# Patient Record
Sex: MALE | Race: WHITE | Employment: FULL TIME | URBAN - METROPOLITAN AREA
[De-identification: names, ages, dates, MRNs, and addresses within clinical notes are randomized per-mention and may not be internally consistent; named-entity substitution may affect disease eponyms.]

---

## 2017-01-18 ENCOUNTER — ALLSCRIPTS OFFICE VISIT (OUTPATIENT)
Dept: OTHER | Facility: OTHER | Age: 62
End: 2017-01-18

## 2017-07-19 ENCOUNTER — ALLSCRIPTS OFFICE VISIT (OUTPATIENT)
Dept: OTHER | Facility: OTHER | Age: 62
End: 2017-07-19

## 2017-07-22 ENCOUNTER — LAB CONVERSION - ENCOUNTER (OUTPATIENT)
Dept: OTHER | Facility: OTHER | Age: 62
End: 2017-07-22

## 2017-07-22 LAB
25(OH)D3 SERPL-MCNC: 28 NG/ML (ref 30–100)
A/G RATIO (HISTORICAL): 1.3 (CALC) (ref 1–2.5)
ALBUMIN SERPL BCP-MCNC: 4.1 G/DL (ref 3.6–5.1)
ALP SERPL-CCNC: 89 U/L (ref 40–115)
ALT SERPL W P-5'-P-CCNC: 28 U/L (ref 9–46)
AST SERPL W P-5'-P-CCNC: 21 U/L (ref 10–35)
BASOPHILS # BLD AUTO: 0.5 %
BASOPHILS # BLD AUTO: 53 CELLS/UL (ref 0–200)
BILIRUB SERPL-MCNC: 0.8 MG/DL (ref 0.2–1.2)
BUN SERPL-MCNC: 17 MG/DL (ref 7–25)
BUN/CREA RATIO (HISTORICAL): NORMAL (CALC) (ref 6–22)
CALCIUM SERPL-MCNC: 9.6 MG/DL (ref 8.6–10.3)
CHLORIDE SERPL-SCNC: 101 MMOL/L (ref 98–110)
CHOLEST SERPL-MCNC: 205 MG/DL (ref 125–200)
CHOLEST/HDLC SERPL: 5.3 (CALC)
CO2 SERPL-SCNC: 29 MMOL/L (ref 20–31)
CREAT SERPL-MCNC: 0.79 MG/DL (ref 0.7–1.33)
DEPRECATED RDW RBC AUTO: 12.7 % (ref 11–15)
EGFR AFRICAN AMERICAN (HISTORICAL): 114 ML/MIN/1.73M2
EGFR-AMERICAN CALC (HISTORICAL): 98 ML/MIN/1.73M2
EOSINOPHIL # BLD AUTO: 1 %
EOSINOPHIL # BLD AUTO: 106 CELLS/UL (ref 15–500)
GAMMA GLOBULIN (HISTORICAL): 3.1 G/DL (CALC) (ref 1.9–3.7)
GLUCOSE (HISTORICAL): 98 MG/DL (ref 65–99)
HCT VFR BLD AUTO: 43.6 % (ref 38.5–50)
HDLC SERPL-MCNC: 39 MG/DL
HGB BLD-MCNC: 14.5 G/DL (ref 13.2–17.1)
LDL CHOLESTEROL (HISTORICAL): 126 MG/DL (CALC)
LYMPHOCYTES # BLD AUTO: 20.6 %
LYMPHOCYTES # BLD AUTO: 2184 CELLS/UL (ref 850–3900)
MCH RBC QN AUTO: 29.8 PG (ref 27–33)
MCHC RBC AUTO-ENTMCNC: 33.3 G/DL (ref 32–36)
MCV RBC AUTO: 89.5 FL (ref 80–100)
MONOCYTES # BLD AUTO: 795 CELLS/UL (ref 200–950)
MONOCYTES (HISTORICAL): 7.5 %
NEUTROPHILS # BLD AUTO: 70.4 %
NEUTROPHILS # BLD AUTO: 7462 CELLS/UL (ref 1500–7800)
NON-HDL-CHOL (CHOL-HDL) (HISTORICAL): 166 MG/DL (CALC)
PLATELET # BLD AUTO: 236 THOUSAND/UL (ref 140–400)
PMV BLD AUTO: 11.6 FL (ref 7.5–12.5)
POTASSIUM SERPL-SCNC: 3.9 MMOL/L (ref 3.5–5.3)
RBC # BLD AUTO: 4.87 MILLION/UL (ref 4.2–5.8)
SODIUM SERPL-SCNC: 139 MMOL/L (ref 135–146)
TOTAL PROTEIN (HISTORICAL): 7.2 G/DL (ref 6.1–8.1)
TRIGL SERPL-MCNC: 201 MG/DL
TSH SERPL DL<=0.05 MIU/L-ACNC: 4.21 MIU/L (ref 0.4–4.5)
WBC # BLD AUTO: 10.6 THOUSAND/UL (ref 3.8–10.8)

## 2017-07-26 ENCOUNTER — GENERIC CONVERSION - ENCOUNTER (OUTPATIENT)
Dept: OTHER | Facility: OTHER | Age: 62
End: 2017-07-26

## 2017-08-02 ENCOUNTER — ALLSCRIPTS OFFICE VISIT (OUTPATIENT)
Dept: OTHER | Facility: OTHER | Age: 62
End: 2017-08-02

## 2018-01-11 NOTE — RESULT NOTES
Verified Results  (1) CBC/PLT/DIFF [id number] 09:27AM Cary Martin     Test Name Result Flag Reference   WHITE BLOOD CELL COUNT 10 6 Thousand/uL  3 8-10 8   RED BLOOD CELL COUNT 4 87 Million/uL  4 20-5 80   HEMOGLOBIN 14 5 g/dL  13 2-17 1   HEMATOCRIT 43 6 %  38 5-50 0   MCV 89 5 fL  80 0-100 0   MCH 29 8 pg  27 0-33 0   MCHC 33 3 g/dL  32 0-36 0   RDW 12 7 %  11 0-15 0   PLATELET COUNT 019 Thousand/uL  140-400   ABSOLUTE NEUTROPHILS 7462 cells/uL  9536-4053   ABSOLUTE LYMPHOCYTES 2184 cells/uL  850-3900   ABSOLUTE MONOCYTES 795 cells/uL  200-950   ABSOLUTE EOSINOPHILS 106 cells/uL     ABSOLUTE BASOPHILS 53 cells/uL  0-200   NEUTROPHILS 70 4 %     LYMPHOCYTES 20 6 %     MONOCYTES 7 5 %     EOSINOPHILS 1 0 %     BASOPHILS 0 5 %     MPV 11 6 fL  7 5-12 5     (1) COMPREHENSIVE METABOLIC PANEL [id number] 22:88FS Cary Martin     Test Name Result Flag Reference   GLUCOSE 98 mg/dL  65-99   Fasting reference interval   UREA NITROGEN (BUN) 17 mg/dL  7-25   CREATININE 0 79 mg/dL  0 70-1 33   For patients >52years of age, the reference limit  for Creatinine is approximately 13% higher for people  identified as -American  eGFR NON-AFR   AMERICAN 98 mL/min/1 73m2  > OR = 60   eGFR AFRICAN AMERICAN 114 mL/min/1 73m2  > OR = 60   BUN/CREATININE RATIO   1-94   NOT APPLICABLE (calc)   SODIUM 139 mmol/L  135-146   POTASSIUM 3 9 mmol/L  3 5-5 3   CHLORIDE 101 mmol/L     CARBON DIOXIDE 29 mmol/L  20-31   CALCIUM 9 6 mg/dL  8 6-10 3   PROTEIN, TOTAL 7 2 g/dL  6 1-8 1   ALBUMIN 4 1 g/dL  3 6-5 1   GLOBULIN 3 1 g/dL (calc)  1 9-3 7   ALBUMIN/GLOBULIN RATIO 1 3 (calc)  1 0-2 5   BILIRUBIN, TOTAL 0 8 mg/dL  0 2-1 2   ALKALINE PHOSPHATASE 89 U/L     AST 21 U/L  10-35   ALT 28 U/L  9-46     (Q) LIPID PANEL WITH REFLEX TO DIRECT LDL [id number] 09:27AM Cary Martin     Test Name Result Flag Reference   CHOLESTEROL, TOTAL 205 mg/dL H 125-200   HDL CHOLESTEROL 39 mg/dL L > OR = 40   TRIGLICERIDES 813 mg/dL H <150   LDL-CHOLESTEROL 126 mg/dL (calc)  <130   Desirable range <100 mg/dL for patients with CHD or  diabetes and <70 mg/dL for diabetic patients with  known heart disease  CHOL/HDLC RATIO 5 3 (calc) H < OR = 5 0   NON HDL CHOLESTEROL 166 mg/dL (calc) H    Target for non-HDL cholesterol is 30 mg/dL higher than   LDL cholesterol target  *(Q) VITAMIN D, 25-HYDROXY, LC/MS/MS [id number] 09:27AM Cary Martin     Test Name Result Flag Reference   VITAMIN D, 25-OH, TOTAL 28 ng/mL L    Vitamin D Status         25-OH Vitamin D:     Deficiency:                    <20 ng/mL  Insufficiency:             20 - 29 ng/mL  Optimal:                 > or = 30 ng/mL     For 25-OH Vitamin D testing on patients on   D2-supplementation and patients for whom quantitation   of D2 and D3 fractions is required, the QuestAssureD(TM)  25-OH VIT D, (D2,D3), LC/MS/MS is recommended: order   code 47335 (patients >2yrs)  For more information on this test, go to:  http://[employer]/faq/ZBA841  (This link is being provided for   informational/educational purposes only )     (Q) TSH, 3RD GENERATION W/REFLEX TO FT4 [id number] 09:27AM Cary Martin   REPORT COMMENT:  FASTING:YES     Test Name Result Flag Reference   TSH W/REFLEX TO FT4 4 21 mIU/L  0 40-4 50

## 2018-01-13 VITALS
BODY MASS INDEX: 33.68 KG/M2 | HEIGHT: 70 IN | HEART RATE: 68 BPM | TEMPERATURE: 97.1 F | RESPIRATION RATE: 16 BRPM | WEIGHT: 235.25 LBS | SYSTOLIC BLOOD PRESSURE: 138 MMHG | DIASTOLIC BLOOD PRESSURE: 78 MMHG

## 2018-01-14 VITALS
HEART RATE: 70 BPM | SYSTOLIC BLOOD PRESSURE: 140 MMHG | DIASTOLIC BLOOD PRESSURE: 80 MMHG | TEMPERATURE: 98 F | HEIGHT: 70 IN | RESPIRATION RATE: 16 BRPM | WEIGHT: 233.38 LBS | BODY MASS INDEX: 33.41 KG/M2

## 2018-01-15 VITALS
WEIGHT: 238.5 LBS | BODY MASS INDEX: 34.14 KG/M2 | HEIGHT: 70 IN | SYSTOLIC BLOOD PRESSURE: 148 MMHG | TEMPERATURE: 96.7 F | HEART RATE: 72 BPM | DIASTOLIC BLOOD PRESSURE: 82 MMHG

## 2018-02-14 ENCOUNTER — OFFICE VISIT (OUTPATIENT)
Dept: FAMILY MEDICINE CLINIC | Facility: CLINIC | Age: 63
End: 2018-02-14
Payer: COMMERCIAL

## 2018-02-14 VITALS
TEMPERATURE: 97 F | BODY MASS INDEX: 35.84 KG/M2 | DIASTOLIC BLOOD PRESSURE: 84 MMHG | HEART RATE: 76 BPM | SYSTOLIC BLOOD PRESSURE: 138 MMHG | WEIGHT: 242 LBS | RESPIRATION RATE: 16 BRPM | HEIGHT: 69 IN

## 2018-02-14 DIAGNOSIS — I10 HYPERTENSION, UNSPECIFIED TYPE: ICD-10-CM

## 2018-02-14 DIAGNOSIS — Z12.11 COLON CANCER SCREENING: ICD-10-CM

## 2018-02-14 DIAGNOSIS — Z12.5 SCREENING FOR PROSTATE CANCER: ICD-10-CM

## 2018-02-14 DIAGNOSIS — Z00.00 ROUTINE HEALTH MAINTENANCE: Primary | ICD-10-CM

## 2018-02-14 DIAGNOSIS — R53.83 FATIGUE, UNSPECIFIED TYPE: ICD-10-CM

## 2018-02-14 DIAGNOSIS — R73.01 IMPAIRED FASTING GLUCOSE: ICD-10-CM

## 2018-02-14 DIAGNOSIS — E78.5 HYPERLIPIDEMIA, UNSPECIFIED HYPERLIPIDEMIA TYPE: ICD-10-CM

## 2018-02-14 PROCEDURE — 99396 PREV VISIT EST AGE 40-64: CPT | Performed by: FAMILY MEDICINE

## 2018-02-14 RX ORDER — ATORVASTATIN CALCIUM 10 MG/1
1 TABLET, FILM COATED ORAL
COMMUNITY
Start: 2017-08-02 | End: 2018-02-14 | Stop reason: SDUPTHER

## 2018-02-14 RX ORDER — METOPROLOL TARTRATE 50 MG/1
1 TABLET, FILM COATED ORAL DAILY
COMMUNITY
Start: 2012-11-07 | End: 2018-02-14 | Stop reason: SDUPTHER

## 2018-02-14 RX ORDER — ATORVASTATIN CALCIUM 10 MG/1
10 TABLET, FILM COATED ORAL
Qty: 90 TABLET | Refills: 1 | Status: SHIPPED | OUTPATIENT
Start: 2018-02-14 | End: 2018-08-24 | Stop reason: SDUPTHER

## 2018-02-14 RX ORDER — CHOLECALCIFEROL (VITAMIN D3) 50 MCG
5000 TABLET ORAL DAILY
COMMUNITY

## 2018-02-14 RX ORDER — HYDROCHLOROTHIAZIDE 25 MG/1
1 TABLET ORAL DAILY
COMMUNITY
Start: 2015-07-01 | End: 2018-02-14 | Stop reason: SDUPTHER

## 2018-02-14 RX ORDER — BENAZEPRIL HYDROCHLORIDE 20 MG/1
40 TABLET ORAL DAILY
Qty: 90 TABLET | Refills: 1 | Status: SHIPPED | OUTPATIENT
Start: 2018-02-14 | End: 2018-08-24 | Stop reason: SDUPTHER

## 2018-02-14 RX ORDER — METOPROLOL TARTRATE 50 MG/1
50 TABLET, FILM COATED ORAL DAILY
Qty: 90 TABLET | Refills: 1 | Status: SHIPPED | OUTPATIENT
Start: 2018-02-14 | End: 2018-08-24 | Stop reason: SDUPTHER

## 2018-02-14 RX ORDER — AMLODIPINE BESYLATE 10 MG/1
10 TABLET ORAL DAILY
Qty: 90 TABLET | Refills: 1 | Status: SHIPPED | OUTPATIENT
Start: 2018-02-14 | End: 2018-08-24 | Stop reason: SDUPTHER

## 2018-02-14 RX ORDER — IPRATROPIUM BROMIDE 42 UG/1
2 SPRAY, METERED NASAL 3 TIMES DAILY PRN
COMMUNITY
Start: 2016-07-05

## 2018-02-14 RX ORDER — BENAZEPRIL HYDROCHLORIDE 20 MG/1
2 TABLET ORAL DAILY
COMMUNITY
Start: 2012-11-07 | End: 2018-02-14 | Stop reason: SDUPTHER

## 2018-02-14 RX ORDER — AMLODIPINE BESYLATE 10 MG/1
1 TABLET ORAL DAILY
COMMUNITY
Start: 2012-11-07 | End: 2018-02-14 | Stop reason: SDUPTHER

## 2018-02-14 RX ORDER — HYDROCHLOROTHIAZIDE 25 MG/1
25 TABLET ORAL DAILY
Qty: 90 TABLET | Refills: 1 | Status: SHIPPED | OUTPATIENT
Start: 2018-02-14 | End: 2018-08-24 | Stop reason: SDUPTHER

## 2018-02-14 NOTE — ASSESSMENT & PLAN NOTE
BP is overall stable today  Continue with amlodipine, benazepril, hydrochlorothiazide, metoprolol as well as lifestyle modifications  Will check CMP

## 2018-02-14 NOTE — ASSESSMENT & PLAN NOTE
Referral to GI provided for screening colonoscopy  Will check routine blood work  Have discussed health maintenance in general including maintaining well balanced diet, getting adequate amount of sleep, starting routine exercise regimen and working on weight loss    Follow-up in 6 months or sooner if needed

## 2018-02-14 NOTE — PROGRESS NOTES
FAMILY PRACTICE OFFICE VISIT       NAME: Selina Galloway  AGE: 63 y o  SEX: male       : 1955        MRN: [id number]    DATE: 2018  TIME: 12:32 PM    Assessment and Plan     Problem List Items Addressed This Visit     Routine health maintenance - Primary       Referral to GI provided for screening colonoscopy  Will check routine blood work  Have discussed health maintenance in general including maintaining well balanced diet, getting adequate amount of sleep, starting routine exercise regimen and working on weight loss  Follow-up in 6 months or sooner if needed         Hypertension     BP is overall stable today  Continue with amlodipine, benazepril, hydrochlorothiazide, metoprolol as well as lifestyle modifications  Will check CMP  Relevant Medications    amLODIPine (NORVASC) 10 mg tablet    benazepril (LOTENSIN) 20 mg tablet    hydrochlorothiazide (HYDRODIURIL) 25 mg tablet    metoprolol tartrate (LOPRESSOR) 50 mg tablet    Other Relevant Orders    CBC and differential    Hyperlipidemia       Patient is on atorvastatin  Continue with lifestyle modifications  Will recheck lipid panel  Relevant Medications    atorvastatin (LIPITOR) 10 mg tablet    Other Relevant Orders    Comprehensive metabolic panel    Lipid Panel with Direct LDL reflex    TSH, 3rd generation with T4 reflex    Impaired fasting glucose       Will check A1c  Have recommended limiting carbohydrates in the diet as well as continue with lifestyle modifications including exercise and working on weight loss  Relevant Orders    Comprehensive metabolic panel    Hemoglobin A1c    Fatigue    Relevant Orders    Vitamin D 25 hydroxy    Screening for prostate cancer    Relevant Orders    PSA, total and free            There are no Patient Instructions on file for this visit          Chief Complaint     Chief Complaint   Patient presents with    Physical Exam     Pt is here for annual physical        History of Present Illness     HPI   63-year-old male here for routine health maintenance exam   Patient states he is doing well overall  Patient is a  and has a difficult time obtaining healthy foods all the time  Patient also has not been exercising as well because of his schedule  He will make an effort to start exercising  Patient states his bp at app2you physical in Nov was 132/74  Has been using green tea  Review of Systems   Review of Systems   Constitutional: Negative  Negative for unexpected weight change  HENT: Negative  Eyes: Negative  Negative for visual disturbance  Respiratory: Negative  Negative for shortness of breath  Cardiovascular: Negative  Gastrointestinal: Negative  Negative for abdominal pain and constipation  Endocrine: Negative  Genitourinary: Negative  Negative for dysuria  Psychiatric/Behavioral: Negative  Negative for dysphoric mood  Active Problem List     Patient Active Problem List   Diagnosis    Routine health maintenance    Hypertension    Hyperlipidemia    Impaired fasting glucose    Fatigue    Screening for prostate cancer       Past Medical History:  No past medical history on file  Past Surgical History:  No past surgical history on file  Family History:  No family history on file  Social History:  Social History     Social History    Marital status: Single     Spouse name: N/A    Number of children: N/A    Years of education: N/A     Occupational History    Not on file  Social History Main Topics    Smoking status: Never Smoker    Smokeless tobacco: Never Used    Alcohol use No    Drug use: No    Sexual activity: Not on file     Other Topics Concern    Not on file     Social History Narrative    No narrative on file   I have reviewed the patient's medical history in detail; there are no changes to the history as noted in the electronic medical record        Objective     Vitals:    02/14/18 1134   BP: 138/84   Pulse: Resp:    Temp:      Wt Readings from Last 3 Encounters:   02/14/18 110 kg (242 lb)   08/02/17 107 kg (235 lb 4 oz)   07/19/17 108 kg (238 lb 8 oz)     Vitals:    02/14/18 1054 02/14/18 1134   BP: 142/80 138/84   BP Location: Right arm    Patient Position: Sitting    Cuff Size: Adult    Pulse: 76    Resp: 16    Temp: (!) 97 °F (36 1 °C)    TempSrc: Tympanic    Weight: 110 kg (242 lb)    Height: 5' 9" (1 753 m)        Physical Exam   Constitutional: He is oriented to person, place, and time  He appears well-developed and well-nourished  HENT:   Head: Normocephalic and atraumatic  Mouth/Throat: Oropharynx is clear and moist    Tms intact   Eyes: Conjunctivae and EOM are normal  Pupils are equal, round, and reactive to light  Neck: Normal range of motion  Neck supple  No thyromegaly present  Cardiovascular: Normal rate and regular rhythm  No murmur heard  Pulmonary/Chest: Effort normal and breath sounds normal    Abdominal: Soft  Bowel sounds are normal  There is no tenderness  Musculoskeletal: Normal range of motion  Lymphadenopathy:     He has no cervical adenopathy  Neurological: He is alert and oriented to person, place, and time  Skin: No rash noted  Psychiatric: He has a normal mood and affect  Nursing note and vitals reviewed        Pertinent Laboratory/Diagnostic Studies:  Lab Results   Component Value Date    BUN 15 06/21/2016    CREATININE 0 82 06/21/2016    CALCIUM 9 5 06/21/2016     06/21/2016    K 4 2 06/21/2016    CO2 22 06/21/2016     06/21/2016     No results found for: ALT, AST, GGT, ALKPHOS, BILITOT    No results found for: WBC, HGB, HCT, MCV, PLT    No results found for: TSH    Lab Results   Component Value Date    CHOL 205 (H) 07/21/2017     Lab Results   Component Value Date    TRIG 201 (H) 07/21/2017     Lab Results   Component Value Date    HDL 39 (L) 07/21/2017     No results found for: [facility]  Lab Results   Component Value Date    HGBA1C 5 7 (H) 06/21/2016 Results for orders placed or performed in visit on 07/22/17   TSH, 3RD GENERATION WITH T4 REFLEX (HISTORICAL)   Result Value Ref Range    TSH 3RD GENERATON 4 21 0 40 - 4 50 mIU/L   VITAMIN D 25 HYDROXY (HISTORICAL)   Result Value Ref Range    Vit D, 25-Hydroxy 28 (L) 30 - 100 ng/mL   LIPID PANEL WITH DIRECT LDL REFLEX (HISTORICAL)   Result Value Ref Range    Cholesterol 205 (H) 125 - 200 mg/dL    HDL 39 (L) > OR = 40 mg/dL    Triglycerides 201 (H) <150 mg/dL    LDL CHOLESTEROL 126 <130 mg/dL (calc)    Chol/HDL Ratio 5 3 (H) < OR = 5 0 (calc)    NON-HDL-CHOL (CHOL-HDL) 166 (H) mg/dL (calc)       Orders Placed This Encounter   Procedures    CBC and differential    Comprehensive metabolic panel    Lipid Panel with Direct LDL reflex    TSH, 3rd generation with T4 reflex    Vitamin D 25 hydroxy    Hemoglobin A1c    PSA, total and free    Ambulatory referral to Gastroenterology       ALLERGIES:  No Known Allergies    Current Medications     Current Outpatient Prescriptions   Medication Sig Dispense Refill    amLODIPine (NORVASC) 10 mg tablet Take 1 tablet (10 mg total) by mouth daily 90 tablet 1    aspirin 81 MG tablet Take by mouth daily        atorvastatin (LIPITOR) 10 mg tablet Take 1 tablet (10 mg total) by mouth daily at bedtime 90 tablet 1    benazepril (LOTENSIN) 20 mg tablet Take 2 tablets (40 mg total) by mouth daily 90 tablet 1    Cholecalciferol (VITAMIN D) 2000 units tablet Take 2,000 Units by mouth daily        hydrochlorothiazide (HYDRODIURIL) 25 mg tablet Take 1 tablet (25 mg total) by mouth daily 90 tablet 1    ipratropium (ATROVENT) 0 06 % nasal spray 2 sprays into each nostril 3 (three) times a day as needed        metoprolol tartrate (LOPRESSOR) 50 mg tablet Take 1 tablet (50 mg total) by mouth daily 90 tablet 1    Multiple Vitamin (MULTI-VITAMIN) tablet Take by mouth       No current facility-administered medications for this visit            Health Maintenance   There are no preventive care reminders to display for this patient    Immunization History   Administered Date(s) Administered     Influenza (IM) Preservative Free 01/06/2016    Influenza Quadrivalent Preservative Free 3 years and older IM 01/18/2017    Influenza TIV (IM) 10/21/2014    Tdap 07/01/2015       Carlito Donald MD

## 2018-02-14 NOTE — ASSESSMENT & PLAN NOTE
Will check A1c  Have recommended limiting carbohydrates in the diet as well as continue with lifestyle modifications including exercise and working on weight loss

## 2018-08-09 LAB
25(OH)D3 SERPL-MCNC: 31 NG/ML (ref 30–100)
ALBUMIN SERPL-MCNC: 3.9 G/DL (ref 3.6–5.1)
ALBUMIN/GLOB SERPL: 1.3 (CALC) (ref 1–2.5)
ALP SERPL-CCNC: 89 U/L (ref 40–115)
ALT SERPL-CCNC: 32 U/L (ref 9–46)
AST SERPL-CCNC: 21 U/L (ref 10–35)
BASOPHILS # BLD AUTO: 29 CELLS/UL (ref 0–200)
BASOPHILS NFR BLD AUTO: 0.3 %
BILIRUB SERPL-MCNC: 0.7 MG/DL (ref 0.2–1.2)
BUN SERPL-MCNC: 16 MG/DL (ref 7–25)
BUN/CREAT SERPL: ABNORMAL (CALC) (ref 6–22)
CALCIUM SERPL-MCNC: 9.2 MG/DL (ref 8.6–10.3)
CHLORIDE SERPL-SCNC: 103 MMOL/L (ref 98–110)
CHOLEST SERPL-MCNC: 162 MG/DL
CHOLEST/HDLC SERPL: 4.8 (CALC)
CO2 SERPL-SCNC: 25 MMOL/L (ref 20–32)
CREAT SERPL-MCNC: 0.84 MG/DL (ref 0.7–1.25)
EOSINOPHIL # BLD AUTO: 147 CELLS/UL (ref 15–500)
EOSINOPHIL NFR BLD AUTO: 1.5 %
ERYTHROCYTE [DISTWIDTH] IN BLOOD BY AUTOMATED COUNT: 12.3 % (ref 11–15)
GLOBULIN SER CALC-MCNC: 3 G/DL (CALC) (ref 1.9–3.7)
GLUCOSE SERPL-MCNC: 127 MG/DL (ref 65–99)
HCT VFR BLD AUTO: 43.5 % (ref 38.5–50)
HDLC SERPL-MCNC: 34 MG/DL
HGB BLD-MCNC: 14.6 G/DL (ref 13.2–17.1)
LDLC SERPL CALC-MCNC: 100 MG/DL (CALC)
LYMPHOCYTES # BLD AUTO: 1921 CELLS/UL (ref 850–3900)
LYMPHOCYTES NFR BLD AUTO: 19.6 %
MCH RBC QN AUTO: 30.2 PG (ref 27–33)
MCHC RBC AUTO-ENTMCNC: 33.6 G/DL (ref 32–36)
MCV RBC AUTO: 89.9 FL (ref 80–100)
MONOCYTES # BLD AUTO: 647 CELLS/UL (ref 200–950)
MONOCYTES NFR BLD AUTO: 6.6 %
NEUTROPHILS # BLD AUTO: 7056 CELLS/UL (ref 1500–7800)
NEUTROPHILS NFR BLD AUTO: 72 %
NONHDLC SERPL-MCNC: 128 MG/DL (CALC)
PLATELET # BLD AUTO: 228 THOUSAND/UL (ref 140–400)
PMV BLD REES-ECKER: 11.8 FL (ref 7.5–12.5)
POTASSIUM SERPL-SCNC: 3.8 MMOL/L (ref 3.5–5.3)
PROT SERPL-MCNC: 6.9 G/DL (ref 6.1–8.1)
PSA FREE MFR SERPL: 26 % (CALC)
PSA FREE SERPL-MCNC: 0.5 NG/ML
PSA SERPL-MCNC: 1.9 NG/ML
RBC # BLD AUTO: 4.84 MILLION/UL (ref 4.2–5.8)
SL AMB EGFR AFRICAN AMERICAN: 110 ML/MIN/1.73M2
SL AMB EGFR NON AFRICAN AMERICAN: 95 ML/MIN/1.73M2
SODIUM SERPL-SCNC: 138 MMOL/L (ref 135–146)
TRIGL SERPL-MCNC: 184 MG/DL
TSH SERPL-ACNC: 3.69 MIU/L (ref 0.4–4.5)
WBC # BLD AUTO: 9.8 THOUSAND/UL (ref 3.8–10.8)

## 2018-08-15 ENCOUNTER — OFFICE VISIT (OUTPATIENT)
Dept: FAMILY MEDICINE CLINIC | Facility: CLINIC | Age: 63
End: 2018-08-15
Payer: COMMERCIAL

## 2018-08-15 DIAGNOSIS — I10 HYPERTENSION, UNSPECIFIED TYPE: Primary | ICD-10-CM

## 2018-08-15 DIAGNOSIS — Z00.00 ROUTINE HEALTH MAINTENANCE: ICD-10-CM

## 2018-08-15 DIAGNOSIS — E78.6 LOW HDL (UNDER 40): ICD-10-CM

## 2018-08-15 DIAGNOSIS — R73.01 IMPAIRED FASTING GLUCOSE: ICD-10-CM

## 2018-08-15 DIAGNOSIS — E78.5 HYPERLIPIDEMIA, UNSPECIFIED HYPERLIPIDEMIA TYPE: ICD-10-CM

## 2018-08-15 DIAGNOSIS — Z12.11 COLON CANCER SCREENING: ICD-10-CM

## 2018-08-15 PROCEDURE — 3008F BODY MASS INDEX DOCD: CPT | Performed by: FAMILY MEDICINE

## 2018-08-15 PROCEDURE — 99214 OFFICE O/P EST MOD 30 MIN: CPT | Performed by: FAMILY MEDICINE

## 2018-08-16 VITALS
DIASTOLIC BLOOD PRESSURE: 80 MMHG | HEIGHT: 69 IN | HEART RATE: 70 BPM | TEMPERATURE: 97.4 F | BODY MASS INDEX: 35.99 KG/M2 | WEIGHT: 243 LBS | SYSTOLIC BLOOD PRESSURE: 140 MMHG

## 2018-08-16 PROBLEM — E78.6 LOW HDL (UNDER 40): Status: ACTIVE | Noted: 2018-08-16

## 2018-08-24 DIAGNOSIS — I10 HYPERTENSION, UNSPECIFIED TYPE: ICD-10-CM

## 2018-08-24 DIAGNOSIS — E78.5 HYPERLIPIDEMIA, UNSPECIFIED HYPERLIPIDEMIA TYPE: ICD-10-CM

## 2018-08-24 RX ORDER — BENAZEPRIL HYDROCHLORIDE 20 MG/1
TABLET ORAL
Qty: 60 TABLET | Refills: 5 | Status: SHIPPED | OUTPATIENT
Start: 2018-08-24 | End: 2019-03-13 | Stop reason: SDUPTHER

## 2018-08-24 RX ORDER — AMLODIPINE BESYLATE 10 MG/1
TABLET ORAL
Qty: 30 TABLET | Refills: 5 | Status: SHIPPED | OUTPATIENT
Start: 2018-08-24 | End: 2019-03-13 | Stop reason: SDUPTHER

## 2018-08-24 RX ORDER — HYDROCHLOROTHIAZIDE 25 MG/1
TABLET ORAL
Qty: 30 TABLET | Refills: 5 | Status: SHIPPED | OUTPATIENT
Start: 2018-08-24 | End: 2019-04-15 | Stop reason: SDUPTHER

## 2018-08-24 RX ORDER — ATORVASTATIN CALCIUM 10 MG/1
TABLET, FILM COATED ORAL
Qty: 30 TABLET | Refills: 5 | Status: SHIPPED | OUTPATIENT
Start: 2018-08-24 | End: 2019-03-13 | Stop reason: SDUPTHER

## 2018-08-24 RX ORDER — METOPROLOL TARTRATE 50 MG/1
TABLET, FILM COATED ORAL
Qty: 30 TABLET | Refills: 2 | Status: SHIPPED | OUTPATIENT
Start: 2018-08-24 | End: 2018-10-10 | Stop reason: ALTCHOICE

## 2018-10-10 ENCOUNTER — OFFICE VISIT (OUTPATIENT)
Dept: CARDIOLOGY CLINIC | Facility: CLINIC | Age: 63
End: 2018-10-10
Payer: COMMERCIAL

## 2018-10-10 VITALS
SYSTOLIC BLOOD PRESSURE: 156 MMHG | HEART RATE: 71 BPM | DIASTOLIC BLOOD PRESSURE: 88 MMHG | HEIGHT: 69 IN | BODY MASS INDEX: 37.03 KG/M2 | WEIGHT: 250 LBS

## 2018-10-10 DIAGNOSIS — I10 ESSENTIAL HYPERTENSION: ICD-10-CM

## 2018-10-10 DIAGNOSIS — R01.1 MURMUR, CARDIAC: ICD-10-CM

## 2018-10-10 DIAGNOSIS — R73.01 IMPAIRED FASTING GLUCOSE: Primary | ICD-10-CM

## 2018-10-10 DIAGNOSIS — E78.5 DYSLIPIDEMIA: ICD-10-CM

## 2018-10-10 DIAGNOSIS — I10 HYPERTENSION, UNSPECIFIED TYPE: ICD-10-CM

## 2018-10-10 DIAGNOSIS — R94.31 ABNORMAL EKG: ICD-10-CM

## 2018-10-10 PROCEDURE — 93000 ELECTROCARDIOGRAM COMPLETE: CPT | Performed by: INTERNAL MEDICINE

## 2018-10-10 PROCEDURE — 99244 OFF/OP CNSLTJ NEW/EST MOD 40: CPT | Performed by: INTERNAL MEDICINE

## 2018-10-10 RX ORDER — CARVEDILOL 6.25 MG/1
6.25 TABLET ORAL 2 TIMES DAILY WITH MEALS
Qty: 60 TABLET | Refills: 11 | Status: SHIPPED | OUTPATIENT
Start: 2018-10-10 | End: 2019-09-21 | Stop reason: SDUPTHER

## 2018-10-10 NOTE — PROGRESS NOTES
Consultation - Cardiology     Amy Begum 63 y o  male MRN: [id number]    Assessment/Plan:    1  HTN  On Amlodipine 10, Benazepril 40, HCTZ 25, and Metoprolol 50 mg daily  BP in office is mildly elevated  Would recommend switching Metoprolol to Coreg 6 25 mg bid for better BP control  Goal BP less than 150/90  He is agreeable to this  2  HL  Lipid panel 8/18 showed total cholesterol 162, , HDL 34,   Prior lipid panel 7/17 showed total cholesterol 205, , HDL 39,   On Atorvastatin 10, which was started in 8/17, he was switched from Simvastatin 40     3  IFG  Fasting glucose elevated at 127 8/18  Currently diet controlled  4  Abnormal EKG/ systolic murmur  EKG shows deep TWI in V3-V6, III, avF  May be due to longstanding HTN, but will order echo to assess LV function  Also has systolic mumur on exam, could be due to AS or MR, will echo will delineate further  He denies any current cardiac symptoms, but is relatively sedentary  1  Impaired fasting glucose     2  Essential hypertension  carvedilol (COREG) 6 25 mg tablet    Echo complete with contrast if indicated   3  Dyslipidemia     4  Hypertension, unspecified type  Ambulatory referral to Cardiology    POCT ECG   5  Abnormal EKG  Echo complete with contrast if indicated   6  Murmur, cardiac  Echo complete with contrast if indicated       HPI: 63 y o  male with a history of HTN, HL, IFG, who is referred by Dr Kate Dominguez for evaluation of HTN  He reports he has difficulty losing weight because he is a  and unable to regulate diet, or exercise regularly  He does not check his BP at home, but it has been elevated at Dr Timothy Morfin office multiple times  He is a very big fan of [employer], goes to [employer] once a year  He doesn't smoke or drink  He is not currently exercising  He is able to go up and down a flight of stairs, no CP or SOB with this type of exertion  No orthopnea, uses 1 pillow to sleep   No PND  No LE edema  Review of Systems:    Review of Systems   Constitution: Positive for weight gain  Negative for chills, decreased appetite, diaphoresis, fever, weakness, malaise/fatigue, night sweats and weight loss  HENT: Negative for ear pain, hearing loss, hoarse voice, nosebleeds, sore throat and tinnitus  Eyes: Negative for blurred vision and pain  Cardiovascular: Negative  Negative for chest pain, claudication, cyanosis, dyspnea on exertion, irregular heartbeat, leg swelling, near-syncope, orthopnea, palpitations, paroxysmal nocturnal dyspnea and syncope  Respiratory: Negative for cough, hemoptysis, shortness of breath, sleep disturbances due to breathing, snoring, sputum production and wheezing  Hematologic/Lymphatic: Negative for adenopathy and bleeding problem  Does not bruise/bleed easily  Skin: Negative for color change, dry skin, flushing, itching, poor wound healing and rash  Musculoskeletal: Negative for arthritis, back pain, falls, joint pain, muscle cramps, muscle weakness, myalgias and neck pain  Gastrointestinal: Negative for abdominal pain, constipation, diarrhea, dysphagia, heartburn, hematemesis, hematochezia, melena, nausea and vomiting  Genitourinary: Positive for nocturia  Negative for dysuria, frequency, hematuria, hesitancy, non-menstrual bleeding and urgency  Neurological: Negative for excessive daytime sleepiness, dizziness, focal weakness, headaches, light-headedness, loss of balance, numbness, paresthesias, tremors and vertigo  Psychiatric/Behavioral: Negative for altered mental status, depression and memory loss  The patient does not have insomnia and is not nervous/anxious  Allergic/Immunologic: Negative for environmental allergies and persistent infections       Active Problems:     Patient Active Problem List   Diagnosis    Routine health maintenance    Essential hypertension    Dyslipidemia    Impaired fasting glucose    Fatigue    Screening for prostate cancer    Low HDL (under 40)       Historical Information   Past Medical History:   Diagnosis Date    Allergic rhinitis     last assessed: 3/10/2014    Insomnia     last assessed: 3/10/2014    Vitamin D deficiency     last assessed: 3/10/2014     No past surgical history on file  History   Alcohol Use No     History   Drug Use No     History   Smoking Status    Never Smoker   Smokeless Tobacco    Never Used       Family History:   Family History   Problem Relation Age of Onset    Pathological gambling Mother     Diabetes Father         mellitus    Ulcers Father         gastrkic    Stroke Brother         syndrome       No Known Allergies      Current Outpatient Prescriptions:     amLODIPine (NORVASC) 10 mg tablet, TAKE ONE TABLET DAILY, Disp: 30 tablet, Rfl: 5    aspirin 81 MG tablet, Take by mouth daily  , Disp: , Rfl:     atorvastatin (LIPITOR) 10 mg tablet, TAKE 1 TABLET DAILY AT BEDTIME, Disp: 30 tablet, Rfl: 5    benazepril (LOTENSIN) 20 mg tablet, TAKE 2 TABLETS DAILY, Disp: 60 tablet, Rfl: 5    Cholecalciferol (VITAMIN D) 2000 units tablet, Take 2,000 Units by mouth daily  , Disp: , Rfl:     hydrochlorothiazide (HYDRODIURIL) 25 mg tablet, TAKE 1 TABLET DAILY, Disp: 30 tablet, Rfl: 5    ipratropium (ATROVENT) 0 06 % nasal spray, 2 sprays into each nostril 3 (three) times a day as needed  , Disp: , Rfl:     carvedilol (COREG) 6 25 mg tablet, Take 1 tablet (6 25 mg total) by mouth 2 (two) times a day with meals, Disp: 60 tablet, Rfl: 11    Multiple Vitamin (MULTI-VITAMIN) tablet, Take by mouth, Disp: , Rfl:     Objective   Vitals:   Vitals:    10/10/18 1655   BP: 156/88   BP Location: Right arm   Patient Position: Sitting   Cuff Size: Large   Pulse: 71   Weight: 113 kg (250 lb)   Height: 5' 9" (1 753 m)          Physical Exam:     GEN: Alert and oriented x 3, in no acute distress  Well appearing and well nourished     HEENT: Sclera anicteric, conjunctivae pink, mucous membranes moist  Oropharynx clear  NECK: Supple, no carotid bruits, no significant JVD  Trachea midline, no thyromegaly  HEART: Regular rhythm, normal S1 and S2, no murmurs, clicks, gallops or rubs  PMI nondisplaced, no thrills  LUNGS: Clear to auscultation bilaterally; no wheezes, rales, or rhonchi  No increased work of breathing or signs of respiratory distress  ABDOMEN: Soft, nontender, nondistended, normoactive bowel sounds  EXTREMITIES: Skin warm and well perfused, no clubbing, cyanosis, or edema  NEURO: No focal findings  Normal gait  Normal speech  Mood and affect normal    SKIN: Normal without suspicious lesions on exposed skin      Lab Results:     Lab Results   Component Value Date    HGBA1C 5 7 (H) 06/21/2016    HGBA1C 5 8 (H) 06/19/2015     Lab Results   Component Value Date    CHOL 205 (H) 07/21/2017    CHOL 160 06/19/2015     Lab Results   Component Value Date    HDL 34 (L) 08/08/2018    HDL 39 (L) 07/21/2017    HDL 31 (L) 06/19/2015     No results found for: [facility]  Lab Results   Component Value Date    TRIG 184 (H) 08/08/2018    TRIG 201 (H) 07/21/2017    TRIG 277 (H) 06/19/2015     No components found for: CHOLHDL

## 2018-11-14 ENCOUNTER — HOSPITAL ENCOUNTER (OUTPATIENT)
Dept: NON INVASIVE DIAGNOSTICS | Facility: CLINIC | Age: 63
Discharge: HOME | End: 2018-11-14
Payer: COMMERCIAL

## 2018-11-14 DIAGNOSIS — I10 ESSENTIAL HYPERTENSION: ICD-10-CM

## 2018-11-14 DIAGNOSIS — R01.1 MURMUR, CARDIAC: ICD-10-CM

## 2018-11-14 DIAGNOSIS — R94.31 ABNORMAL EKG: ICD-10-CM

## 2018-11-14 PROCEDURE — 93306 TTE W/DOPPLER COMPLETE: CPT

## 2018-11-14 PROCEDURE — 93306 TTE W/DOPPLER COMPLETE: CPT | Performed by: INTERNAL MEDICINE

## 2019-02-20 ENCOUNTER — OFFICE VISIT (OUTPATIENT)
Dept: FAMILY MEDICINE CLINIC | Facility: CLINIC | Age: 64
End: 2019-02-20
Payer: COMMERCIAL

## 2019-02-20 VITALS
WEIGHT: 248.4 LBS | HEIGHT: 69 IN | SYSTOLIC BLOOD PRESSURE: 138 MMHG | RESPIRATION RATE: 16 BRPM | HEART RATE: 84 BPM | BODY MASS INDEX: 36.79 KG/M2 | DIASTOLIC BLOOD PRESSURE: 78 MMHG | TEMPERATURE: 98.6 F

## 2019-02-20 DIAGNOSIS — R73.01 IMPAIRED FASTING GLUCOSE: ICD-10-CM

## 2019-02-20 DIAGNOSIS — J06.9 UPPER RESPIRATORY TRACT INFECTION, UNSPECIFIED TYPE: ICD-10-CM

## 2019-02-20 DIAGNOSIS — Z12.5 PROSTATE CANCER SCREENING: ICD-10-CM

## 2019-02-20 DIAGNOSIS — I10 HYPERTENSION, UNSPECIFIED TYPE: Primary | ICD-10-CM

## 2019-02-20 DIAGNOSIS — E55.9 VITAMIN D DEFICIENCY: ICD-10-CM

## 2019-02-20 DIAGNOSIS — E78.5 HYPERLIPIDEMIA, UNSPECIFIED HYPERLIPIDEMIA TYPE: ICD-10-CM

## 2019-02-20 DIAGNOSIS — Z00.00 ROUTINE HEALTH MAINTENANCE: ICD-10-CM

## 2019-02-20 PROCEDURE — 99214 OFFICE O/P EST MOD 30 MIN: CPT | Performed by: FAMILY MEDICINE

## 2019-02-20 PROCEDURE — 3075F SYST BP GE 130 - 139MM HG: CPT | Performed by: FAMILY MEDICINE

## 2019-02-20 PROCEDURE — 3078F DIAST BP <80 MM HG: CPT | Performed by: FAMILY MEDICINE

## 2019-02-20 PROCEDURE — 3008F BODY MASS INDEX DOCD: CPT | Performed by: FAMILY MEDICINE

## 2019-02-20 RX ORDER — AMOXICILLIN 500 MG/1
500 CAPSULE ORAL EVERY 12 HOURS SCHEDULED
Qty: 14 CAPSULE | Refills: 0 | Status: SHIPPED | OUTPATIENT
Start: 2019-02-20 | End: 2019-02-27

## 2019-02-20 NOTE — PROGRESS NOTES
FAMILY PRACTICE OFFICE VISIT       NAME: Parul Barr  AGE: 66 y o  SEX: male       : 1955        MRN: [id number]    DATE: 2019  TIME: 7:57 PM    Assessment and Plan     Problem List Items Addressed This Visit        Endocrine    Impaired fasting glucose    Relevant Orders    Hemoglobin A1C    CBC and differential       Other    Routine health maintenance      Other Visit Diagnoses     Hypertension, unspecified type    -  Primary    Relevant Orders    CBC and differential    Comprehensive metabolic panel    Hyperlipidemia, unspecified hyperlipidemia type        Relevant Orders    CBC and differential    Comprehensive metabolic panel    Lipid Panel with Direct LDL reflex    TSH, 3rd generation with Free T4 reflex    Vitamin D deficiency        Relevant Orders    CBC and differential    Vitamin D 25 hydroxy    Upper respiratory tract infection, unspecified type        Relevant Medications    amoxicillin (AMOXIL) 500 mg capsule    Prostate cancer screening        Relevant Orders    PSA, total and free          Hypertension:    Overall stable and controlled  Continue with amlodipine, carvedilol, benazepril, hydrochlorothiazide and lifestyle modifications  Hyperlipidemia/  Low HDL:    Have discussed the importance of increasing heart healthy fats in the diet by incorporating Mediterranean diet and starting routine exercise regimen in the past   Continue with atorvastatin and lifestyle modifications  Will check lipid panel  Impaired fasting glucose: Will continue to monitor  Blood work Rx for A1c provided  Vitamin-D deficiency:  Will recheck vitamin-D level  Upper respiratory infection:  Likely viral   Will provide Rx for amoxicillin to fill if symptoms persist or worsen greater than 7 days  Continue with symptomatic treatment and supportive measures including adequate hydration  Return parameters discussed  Routine health maintenance:    Rx for cologuard faxed at last visit  Will check PSA  Have strongly encouraged on maintaining well balanced diet, getting adequate amount of sleep and starting routine exercise regimen to work on weight loss  There are no Patient Instructions on file for this visit  Chief Complaint     Chief Complaint   Patient presents with    Follow-up       History of Present Illness     HPI   77-year-old male here for routine follow-up of chronic conditions  Patient states he has been experiencing Pnd, cough that is occasionally productive, feeling better    used vicks last night   has been eating more salads  Is thinking of by a treadmill      Review of Systems   Review of Systems   Constitutional: Negative for unexpected weight change  HENT: Positive for postnasal drip  Eyes: Negative for visual disturbance  Respiratory: Positive for cough  Negative for shortness of breath  Cardiovascular: Negative  Gastrointestinal: Negative for abdominal pain and constipation  Genitourinary: Negative for dysuria  Neurological: Negative for dizziness and light-headedness  Psychiatric/Behavioral: Negative for dysphoric mood  Active Problem List     Patient Active Problem List   Diagnosis    Routine health maintenance    Essential hypertension    Dyslipidemia    Impaired fasting glucose    Fatigue    Screening for prostate cancer    Low HDL (under 40)       Past Medical History:  Past Medical History:   Diagnosis Date    Allergic rhinitis     last assessed: 3/10/2014    Insomnia     last assessed: 3/10/2014    Vitamin D deficiency     last assessed: 3/10/2014       Past Surgical History:  No past surgical history on file      Family History:  Family History   Problem Relation Age of Onset   Cassidy Mckeon Pathological gambling Mother     Diabetes Father         mellitus    Ulcers Father         gastrkic    Stroke Brother         syndrome       Social History:  Social History     Socioeconomic History    Marital status: Single     Spouse name: Not on file    Number of children: Not on file    Years of education: Not on file    Highest education level: Not on file   Occupational History    Not on file   Social Needs    Financial resource strain: Not on file    Food insecurity:     Worry: Not on file     Inability: Not on file    Transportation needs:     Medical: Not on file     Non-medical: Not on file   Tobacco Use    Smoking status: Never Smoker    Smokeless tobacco: Never Used   Substance and Sexual Activity    Alcohol use: No    Drug use: No    Sexual activity: Not on file   Lifestyle    Physical activity:     Days per week: Not on file     Minutes per session: Not on file    Stress: Not on file   Relationships    Social connections:     Talks on phone: Not on file     Gets together: Not on file     Attends [employer] service: Not on file     Active member of club or organization: Not on file     Attends meetings of clubs or organizations: Not on file     Relationship status: Not on file    Intimate partner violence:     Fear of current or ex partner: Not on file     Emotionally abused: Not on file     Physically abused: Not on file     Forced sexual activity: Not on file   Other Topics Concern    Not on file   Social History Narrative    Not on file     I have reviewed the patient's medical history in detail; there are no changes to the history as noted in the electronic medical record  Objective     Vitals:    02/20/19 1127   BP: 138/78   Pulse: 84   Resp: 16   Temp: 98 6 °F (37 °C)     Wt Readings from Last 3 Encounters:   02/20/19 113 kg (248 lb 6 4 oz)   10/10/18 113 kg (250 lb)   08/15/18 110 kg (243 lb)           Physical Exam   Constitutional: He is oriented to person, place, and time  He appears well-developed and well-nourished  HENT:   Head: Normocephalic and atraumatic  Mouth/Throat: Oropharynx is clear and moist    TMs intact and clear  Postnasal drainage noted  Tonsils mildly enlarged, 2+ bilaterally     Eyes: Pupils are equal, round, and reactive to light  Conjunctivae and EOM are normal    Neck: Normal range of motion  Neck supple  No thyromegaly present  Cardiovascular: Normal rate, regular rhythm and normal heart sounds  No carotid bruits auscultated   Pulmonary/Chest: Effort normal and breath sounds normal    Abdominal: Soft  Bowel sounds are normal  He exhibits no distension  There is no tenderness  Musculoskeletal: Normal range of motion  He exhibits no edema  Lymphadenopathy:     He has no cervical adenopathy  Neurological: He is alert and oriented to person, place, and time  Skin: No rash noted  Psychiatric: He has a normal mood and affect  Nursing note and vitals reviewed        Pertinent Laboratory/Diagnostic Studies:  Lab Results   Component Value Date    BUN 16 08/08/2018    CREATININE 0 79 07/21/2017    CALCIUM 9 2 08/08/2018     07/21/2017    K 3 8 08/08/2018    CO2 25 08/08/2018     08/08/2018     Lab Results   Component Value Date    ALT 32 08/08/2018    AST 21 08/08/2018    ALKPHOS 89 08/08/2018    BILITOT 0 8 07/21/2017       Lab Results   Component Value Date    WBC 9 8 08/08/2018    HGB 14 6 08/08/2018    HCT 43 5 08/08/2018    MCV 89 9 08/08/2018     08/08/2018       No results found for: TSH    Lab Results   Component Value Date    CHOL 205 (H) 07/21/2017     Lab Results   Component Value Date    TRIG 184 (H) 08/08/2018     Lab Results   Component Value Date    HDL 34 (L) 08/08/2018     No results found for: [facility]  Lab Results   Component Value Date    HGBA1C 5 7 (H) 06/21/2016       Results for orders placed or performed in visit on 08/08/18   Lipid Panel with Direct LDL reflex   Result Value Ref Range    Total Cholesterol 162 <200 mg/dL    HDL 34 (L) >40 mg/dL    Triglycerides 184 (H) <150 mg/dL    LDL Direct 100 (H) mg/dL (calc)    Chol HDLC Ratio 4 8 <5 0 (calc)    Non-HDL Cholesterol 128 <130 mg/dL (calc)   Comprehensive metabolic panel   Result Value Ref Range    Glucose, Random 127 (H) 65 - 99 mg/dL    BUN 16 7 - 25 mg/dL    Creatinine 0 84 0 70 - 1 25 mg/dL    eGFR Non  95 > OR = 60 mL/min/1 73m2    eGFR  110 > OR = 60 mL/min/1 73m2    SL AMB BUN/CREATININE RATIO NOT APPLICABLE 6 - 22 (calc)    Sodium 138 135 - 146 mmol/L    Potassium 3 8 3 5 - 5 3 mmol/L    Chloride 103 98 - 110 mmol/L    CO2 25 20 - 32 mmol/L    SL AMB CALCIUM 9 2 8 6 - 10 3 mg/dL    Protein, Total 6 9 6 1 - 8 1 g/dL    Albumin 3 9 3 6 - 5 1 g/dL    Globulin 3 0 1 9 - 3 7 g/dL (calc)    Albumin/Globulin Ratio 1 3 1 0 - 2 5 (calc)    TOTAL BILIRUBIN 0 7 0 2 - 1 2 mg/dL    Alkaline Phosphatase 89 40 - 115 U/L    AST 21 10 - 35 U/L    ALT 32 9 - 46 U/L   CBC and differential   Result Value Ref Range    White Blood Cell Count 9 8 3 8 - 10 8 Thousand/uL    Red Blood Cell Count 4 84 4 20 - 5 80 Million/uL    Hemoglobin 14 6 13 2 - 17 1 g/dL    HCT 43 5 38 5 - 50 0 %    MCV 89 9 80 0 - 100 0 fL    MCH 30 2 27 0 - 33 0 pg    MCHC 33 6 32 0 - 36 0 g/dL    RDW 12 3 11 0 - 15 0 %    Platelet Count 967 555 - 400 Thousand/uL    SL AMB MPV 11 8 7 5 - 12 5 fL    Neutrophils (Absolute) 7,056 1,500 - 7,800 cells/uL    Lymphocytes (Absolute) 1,921 850 - 3,900 cells/uL    Monocytes (Absolute) 647 200 - 950 cells/uL    Eosinophils (Absolute) 147 15 - 500 cells/uL    Basophils ABS 29 0 - 200 cells/uL    Neutrophils 72 %    Lymphocytes 19 6 %    Monocytes 6 6 %    Eosinophils 1 5 %    Basophils PCT 0 3 %   Vitamin D 25 hydroxy   Result Value Ref Range    Vitamin D, 25-Hydroxy, Serum 31 30 - 100 ng/mL   TSH, 3rd generation with Free T4 reflex   Result Value Ref Range    TSH W/RFX TO FREE T4 3 69 0 40 - 4 50 mIU/L   PSA, total and free   Result Value Ref Range    Prostate Specific Antigen Total 1 9 < OR = 4 0 ng/mL    PSA, Free 0 5 ng/mL    PSA, Free Pct 26 >25 % (calc)       Orders Placed This Encounter   Procedures    Hemoglobin A1C    CBC and differential    Comprehensive metabolic panel    Lipid Panel with Direct LDL reflex    TSH, 3rd generation with Free T4 reflex    Vitamin D 25 hydroxy    PSA, total and free       ALLERGIES:  No Known Allergies    Current Medications     Current Outpatient Medications   Medication Sig Dispense Refill    amLODIPine (NORVASC) 10 mg tablet TAKE ONE TABLET DAILY 30 tablet 5    aspirin 81 MG tablet Take by mouth daily        atorvastatin (LIPITOR) 10 mg tablet TAKE 1 TABLET DAILY AT BEDTIME 30 tablet 5    benazepril (LOTENSIN) 20 mg tablet TAKE 2 TABLETS DAILY 60 tablet 5    carvedilol (COREG) 6 25 mg tablet Take 1 tablet (6 25 mg total) by mouth 2 (two) times a day with meals 60 tablet 11    Cholecalciferol (VITAMIN D) 2000 units tablet Take 2,000 Units by mouth daily        hydrochlorothiazide (HYDRODIURIL) 25 mg tablet TAKE 1 TABLET DAILY 30 tablet 5    ipratropium (ATROVENT) 0 06 % nasal spray 2 sprays into each nostril 3 (three) times a day as needed        Multiple Vitamin (MULTI-VITAMIN) tablet Take 1 tablet by mouth       amoxicillin (AMOXIL) 500 mg capsule Take 1 capsule (500 mg total) by mouth every 12 (twelve) hours for 7 days 14 capsule 0     No current facility-administered medications for this visit            Health Maintenance     Health Maintenance   Topic Date Due    Hepatitis C Screening  1955    CRC Screening: Colonoscopy  1955    BMI: Followup Plan  11/05/1975    INFLUENZA VACCINE  07/01/2018    Depression Screening PHQ  08/15/2019    BMI: Adult  02/20/2020    DTaP,Tdap,and Td Vaccines (2 - Td) 07/01/2025    HEPATITIS B VACCINES  Aged Out     Immunization History   Administered Date(s) Administered     Influenza (IM) Preservative Free 01/06/2016    Influenza Quadrivalent Preservative Free 3 years and older IM 01/18/2017    Influenza TIV (IM) 10/21/2014    Tdap 07/01/2015       Alveta Hammans, MD

## 2019-03-13 DIAGNOSIS — I10 HYPERTENSION, UNSPECIFIED TYPE: ICD-10-CM

## 2019-03-13 DIAGNOSIS — E78.5 HYPERLIPIDEMIA, UNSPECIFIED HYPERLIPIDEMIA TYPE: ICD-10-CM

## 2019-03-13 RX ORDER — AMLODIPINE BESYLATE 10 MG/1
TABLET ORAL
Qty: 30 TABLET | Refills: 5 | Status: SHIPPED | OUTPATIENT
Start: 2019-03-13 | End: 2019-09-21 | Stop reason: SDUPTHER

## 2019-03-13 RX ORDER — BENAZEPRIL HYDROCHLORIDE 20 MG/1
TABLET ORAL
Qty: 60 TABLET | Refills: 5 | Status: SHIPPED | OUTPATIENT
Start: 2019-03-13 | End: 2020-02-21

## 2019-03-13 RX ORDER — ATORVASTATIN CALCIUM 10 MG/1
TABLET, FILM COATED ORAL
Qty: 30 TABLET | Refills: 5 | Status: SHIPPED | OUTPATIENT
Start: 2019-03-13 | End: 2019-09-21 | Stop reason: SDUPTHER

## 2019-04-15 DIAGNOSIS — I10 HYPERTENSION, UNSPECIFIED TYPE: ICD-10-CM

## 2019-04-15 RX ORDER — HYDROCHLOROTHIAZIDE 25 MG/1
TABLET ORAL
Qty: 30 TABLET | Refills: 5 | Status: SHIPPED | OUTPATIENT
Start: 2019-04-15 | End: 2019-09-21 | Stop reason: SDUPTHER

## 2019-08-14 LAB
25(OH)D3 SERPL-MCNC: 35 NG/ML (ref 30–100)
ALBUMIN SERPL-MCNC: 3.8 G/DL (ref 3.6–5.1)
ALBUMIN/GLOB SERPL: 1.2 (CALC) (ref 1–2.5)
ALP SERPL-CCNC: 83 U/L (ref 40–115)
ALT SERPL-CCNC: 35 U/L (ref 9–46)
AST SERPL-CCNC: 23 U/L (ref 10–35)
BASOPHILS # BLD AUTO: 41 CELLS/UL (ref 0–200)
BASOPHILS NFR BLD AUTO: 0.4 %
BILIRUB SERPL-MCNC: 0.6 MG/DL (ref 0.2–1.2)
BUN SERPL-MCNC: 23 MG/DL (ref 7–25)
BUN/CREAT SERPL: ABNORMAL (CALC) (ref 6–22)
CALCIUM SERPL-MCNC: 9.3 MG/DL (ref 8.6–10.3)
CHLORIDE SERPL-SCNC: 105 MMOL/L (ref 98–110)
CHOLEST SERPL-MCNC: 157 MG/DL
CHOLEST/HDLC SERPL: 4.6 (CALC)
CO2 SERPL-SCNC: 27 MMOL/L (ref 20–32)
CREAT SERPL-MCNC: 0.96 MG/DL (ref 0.7–1.25)
EOSINOPHIL # BLD AUTO: 134 CELLS/UL (ref 15–500)
EOSINOPHIL NFR BLD AUTO: 1.3 %
ERYTHROCYTE [DISTWIDTH] IN BLOOD BY AUTOMATED COUNT: 12.7 % (ref 11–15)
GLOBULIN SER CALC-MCNC: 3.2 G/DL (CALC) (ref 1.9–3.7)
GLUCOSE SERPL-MCNC: 123 MG/DL (ref 65–99)
HBA1C MFR BLD: 6 % OF TOTAL HGB
HCT VFR BLD AUTO: 41 % (ref 38.5–50)
HDLC SERPL-MCNC: 34 MG/DL
HGB BLD-MCNC: 13.7 G/DL (ref 13.2–17.1)
LDLC SERPL CALC-MCNC: 96 MG/DL (CALC)
LYMPHOCYTES # BLD AUTO: 2112 CELLS/UL (ref 850–3900)
LYMPHOCYTES NFR BLD AUTO: 20.5 %
MCH RBC QN AUTO: 30 PG (ref 27–33)
MCHC RBC AUTO-ENTMCNC: 33.4 G/DL (ref 32–36)
MCV RBC AUTO: 89.7 FL (ref 80–100)
MONOCYTES # BLD AUTO: 906 CELLS/UL (ref 200–950)
MONOCYTES NFR BLD AUTO: 8.8 %
NEUTROPHILS # BLD AUTO: 7107 CELLS/UL (ref 1500–7800)
NEUTROPHILS NFR BLD AUTO: 69 %
NONHDLC SERPL-MCNC: 123 MG/DL (CALC)
PLATELET # BLD AUTO: 236 THOUSAND/UL (ref 140–400)
PMV BLD REES-ECKER: 12 FL (ref 7.5–12.5)
POTASSIUM SERPL-SCNC: 3.9 MMOL/L (ref 3.5–5.3)
PROT SERPL-MCNC: 7 G/DL (ref 6.1–8.1)
PSA FREE MFR SERPL: 27 % (CALC)
PSA FREE SERPL-MCNC: 0.6 NG/ML
PSA SERPL-MCNC: 2.2 NG/ML
RBC # BLD AUTO: 4.57 MILLION/UL (ref 4.2–5.8)
SL AMB EGFR AFRICAN AMERICAN: 98 ML/MIN/1.73M2
SL AMB EGFR NON AFRICAN AMERICAN: 85 ML/MIN/1.73M2
SODIUM SERPL-SCNC: 141 MMOL/L (ref 135–146)
TRIGL SERPL-MCNC: 177 MG/DL
TSH SERPL-ACNC: 2.99 MIU/L (ref 0.4–4.5)
WBC # BLD AUTO: 10.3 THOUSAND/UL (ref 3.8–10.8)

## 2019-08-21 ENCOUNTER — OFFICE VISIT (OUTPATIENT)
Dept: FAMILY MEDICINE CLINIC | Facility: CLINIC | Age: 64
End: 2019-08-21
Payer: COMMERCIAL

## 2019-08-21 VITALS
WEIGHT: 248.8 LBS | HEIGHT: 69 IN | OXYGEN SATURATION: 98 % | DIASTOLIC BLOOD PRESSURE: 86 MMHG | RESPIRATION RATE: 18 BRPM | TEMPERATURE: 98.6 F | SYSTOLIC BLOOD PRESSURE: 122 MMHG | HEART RATE: 74 BPM | BODY MASS INDEX: 36.85 KG/M2

## 2019-08-21 DIAGNOSIS — E55.9 VITAMIN D DEFICIENCY: ICD-10-CM

## 2019-08-21 DIAGNOSIS — E78.5 HYPERLIPIDEMIA, UNSPECIFIED HYPERLIPIDEMIA TYPE: ICD-10-CM

## 2019-08-21 DIAGNOSIS — Z11.59 NEED FOR HEPATITIS C SCREENING TEST: ICD-10-CM

## 2019-08-21 DIAGNOSIS — I10 HYPERTENSION, UNSPECIFIED TYPE: Primary | ICD-10-CM

## 2019-08-21 DIAGNOSIS — Z12.11 COLON CANCER SCREENING: ICD-10-CM

## 2019-08-21 DIAGNOSIS — R73.01 IMPAIRED FASTING GLUCOSE: ICD-10-CM

## 2019-08-21 PROCEDURE — 3074F SYST BP LT 130 MM HG: CPT | Performed by: FAMILY MEDICINE

## 2019-08-21 PROCEDURE — 3008F BODY MASS INDEX DOCD: CPT | Performed by: FAMILY MEDICINE

## 2019-08-21 PROCEDURE — 3079F DIAST BP 80-89 MM HG: CPT | Performed by: FAMILY MEDICINE

## 2019-08-21 PROCEDURE — 99214 OFFICE O/P EST MOD 30 MIN: CPT | Performed by: FAMILY MEDICINE

## 2019-08-21 NOTE — PROGRESS NOTES
FAMILY PRACTICE OFFICE VISIT       NAME: Karol Lim  AGE: 66 y o  SEX: male       : 1955        MRN: [id number]    DATE: 2019  TIME: 12:45 PM    Assessment and Plan     Problem List Items Addressed This Visit        Endocrine    Impaired fasting glucose    Relevant Orders    Hemoglobin A1C      Other Visit Diagnoses     Hypertension, unspecified type    -  Primary    Relevant Orders    Comprehensive metabolic panel    Hyperlipidemia, unspecified hyperlipidemia type        Relevant Orders    Comprehensive metabolic panel    Vitamin D deficiency        Need for hepatitis C screening test        Relevant Orders    Hepatitis C antibody    Colon cancer screening        Relevant Orders    Cologuard          Hypertension:    Overall stable and controlled  Continue with amlodipine, carvedilol, benazepril, hydrochlorothiazide and lifestyle modifications  Hyperlipidemia/  Low HDL:    Have discussed the importance of increasing heart healthy fats in the diet by incorporating Mediterranean diet and starting routine exercise regimen in the past   Continue with atorvastatin and lifestyle modifications  Impaired fasting glucose: Will continue to monitor  Recent A1c noted to be 6 0  Vitamin-D deficiency:  Continue with vitamin-D supplementation  Will continue to monitor  Routine health maintenance:    Rx for cologuard faxed at last visit  I will recheck this again  Have strongly encouraged on maintaining well balanced diet, getting adequate amount of sleep and starting routine exercise regimen to work on weight loss  Up-to-date with Tdap  There are no Patient Instructions on file for this visit  Chief Complaint     Chief Complaint   Patient presents with    Follow-up     6 month       History of Present Illness     HPI  53-year-old male here for routine follow-up of chronic medical conditions and discuss recent blood work results  Patient states he is doing well overall  Has been trying to eat healthier  He is not exercising however is going to start  He is a   Review of Systems   Review of Systems   Constitutional: Negative for unexpected weight change  Eyes: Negative for visual disturbance  Respiratory: Negative for shortness of breath  Cardiovascular: Negative  Gastrointestinal: Negative for abdominal pain and constipation  Genitourinary: Negative for dysuria  Neurological: Negative for dizziness and light-headedness  Psychiatric/Behavioral: Negative for dysphoric mood  Active Problem List     Patient Active Problem List   Diagnosis    Routine health maintenance    Essential hypertension    Dyslipidemia    Impaired fasting glucose    Fatigue    Screening for prostate cancer    Low HDL (under 40)       Past Medical History:  Past Medical History:   Diagnosis Date    Allergic rhinitis     last assessed: 3/10/2014    Insomnia     last assessed: 3/10/2014    Vitamin D deficiency     last assessed: 3/10/2014       Past Surgical History:  History reviewed  No pertinent surgical history      Family History:  Family History   Problem Relation Age of Onset   Carola Carmona Pathological gambling Mother     Diabetes Father         mellitus    Ulcers Father         gastrkic    Stroke Brother         syndrome       Social History:  Social History     Socioeconomic History    Marital status: Single     Spouse name: Not on file    Number of children: Not on file    Years of education: Not on file    Highest education level: Not on file   Occupational History    Not on file   Social Needs    Financial resource strain: Not on file    Food insecurity:     Worry: Not on file     Inability: Not on file    Transportation needs:     Medical: Not on file     Non-medical: Not on file   Tobacco Use    Smoking status: Never Smoker    Smokeless tobacco: Never Used   Substance and Sexual Activity    Alcohol use: No    Drug use: No    Sexual activity: Not on file   Lifestyle    Physical activity:     Days per week: Not on file     Minutes per session: Not on file    Stress: Not on file   Relationships    Social connections:     Talks on phone: Not on file     Gets together: Not on file     Attends [employer] service: Not on file     Active member of club or organization: Not on file     Attends meetings of clubs or organizations: Not on file     Relationship status: Not on file    Intimate partner violence:     Fear of current or ex partner: Not on file     Emotionally abused: Not on file     Physically abused: Not on file     Forced sexual activity: Not on file   Other Topics Concern    Not on file   Social History Narrative    Not on file     I have reviewed the patient's medical history in detail; there are no changes to the history as noted in the electronic medical record  Objective     Vitals:    08/21/19 1200   BP: 122/86   Pulse: 74   Resp: 18   Temp: 98 6 °F (37 °C)   SpO2: 98%     Wt Readings from Last 3 Encounters:   08/21/19 113 kg (248 lb 12 8 oz)   02/20/19 113 kg (248 lb 6 4 oz)   10/10/18 113 kg (250 lb)       Physical Exam   Constitutional: He appears well-developed and well-nourished  HENT:   Head: Normocephalic and atraumatic  Mouth/Throat: Oropharynx is clear and moist    TMs intact and clear   Eyes: Pupils are equal, round, and reactive to light  Conjunctivae and EOM are normal    Neck: Normal range of motion  Neck supple  No thyromegaly present  Cardiovascular: Normal rate, regular rhythm and normal heart sounds  Pulmonary/Chest: Effort normal and breath sounds normal    Abdominal: Soft  Bowel sounds are normal  He exhibits no distension  There is no tenderness  Musculoskeletal: Normal range of motion  He exhibits no edema  Lymphadenopathy:     He has no cervical adenopathy  Neurological: He is alert  Psychiatric: He has a normal mood and affect  Nursing note and vitals reviewed        Pertinent Laboratory/Diagnostic Studies:  Lab Results   Component Value Date    BUN 23 08/13/2019    CREATININE 0 96 08/13/2019    CALCIUM 9 3 08/13/2019     07/21/2017    K 3 9 08/13/2019    CO2 27 08/13/2019     08/13/2019     Lab Results   Component Value Date    ALT 35 08/13/2019    AST 23 08/13/2019    ALKPHOS 83 08/13/2019    BILITOT 0 8 07/21/2017       Lab Results   Component Value Date    WBC 10 3 08/13/2019    HGB 13 7 08/13/2019    HCT 41 0 08/13/2019    MCV 89 7 08/13/2019     08/13/2019       No results found for: TSH    Lab Results   Component Value Date    CHOL 205 (H) 07/21/2017     Lab Results   Component Value Date    TRIG 177 (H) 08/13/2019     Lab Results   Component Value Date    HDL 34 (L) 08/13/2019     No results found for: [address]  Lab Results   Component Value Date    HGBA1C 6 0 (H) 08/13/2019       Results for orders placed or performed in visit on 08/13/19   Lipid Panel with Direct LDL reflex   Result Value Ref Range    Total Cholesterol 157 <200 mg/dL    HDL 34 (L) >40 mg/dL    Triglycerides 177 (H) <150 mg/dL    LDL Direct 96 mg/dL (calc)    Chol HDLC Ratio 4 6 <5 0 (calc)    Non-HDL Cholesterol 123 <130 mg/dL (calc)   Comprehensive metabolic panel   Result Value Ref Range    Glucose, Random 123 (H) 65 - 99 mg/dL    BUN 23 7 - 25 mg/dL    Creatinine 0 96 0 70 - 1 25 mg/dL    eGFR Non  85 > OR = 60 mL/min/1 73m2    eGFR  98 > OR = 60 mL/min/1 73m2    SL AMB BUN/CREATININE RATIO NOT APPLICABLE 6 - 22 (calc)    Sodium 141 135 - 146 mmol/L    Potassium 3 9 3 5 - 5 3 mmol/L    Chloride 105 98 - 110 mmol/L    CO2 27 20 - 32 mmol/L    SL AMB CALCIUM 9 3 8 6 - 10 3 mg/dL    Protein, Total 7 0 6 1 - 8 1 g/dL    Albumin 3 8 3 6 - 5 1 g/dL    Globulin 3 2 1 9 - 3 7 g/dL (calc)    Albumin/Globulin Ratio 1 2 1 0 - 2 5 (calc)    TOTAL BILIRUBIN 0 6 0 2 - 1 2 mg/dL    Alkaline Phosphatase 83 40 - 115 U/L    AST 23 10 - 35 U/L    ALT 35 9 - 46 U/L   CBC and differential   Result Value Ref Range    White Blood Cell Count 10 3 3 8 - 10 8 Thousand/uL    Red Blood Cell Count 4 57 4 20 - 5 80 Million/uL    Hemoglobin 13 7 13 2 - 17 1 g/dL    HCT 41 0 38 5 - 50 0 %    MCV 89 7 80 0 - 100 0 fL    MCH 30 0 27 0 - 33 0 pg    MCHC 33 4 32 0 - 36 0 g/dL    RDW 12 7 11 0 - 15 0 %    Platelet Count 949 598 - 400 Thousand/uL    SL AMB MPV 12 0 7 5 - 12 5 fL    Neutrophils (Absolute) 7,107 1,500 - 7,800 cells/uL    Lymphocytes (Absolute) 2,112 850 - 3,900 cells/uL    Monocytes (Absolute) 906 200 - 950 cells/uL    Eosinophils (Absolute) 134 15 - 500 cells/uL    Basophils ABS 41 0 - 200 cells/uL    Neutrophils 69 %    Lymphocytes 20 5 %    Monocytes 8 8 %    Eosinophils 1 3 %    Basophils PCT 0 4 %   Vitamin D 25 hydroxy   Result Value Ref Range    Vitamin D, 25-Hydroxy, Serum 35 30 - 100 ng/mL   TSH, 3rd generation with Free T4 reflex   Result Value Ref Range    TSH W/RFX TO FREE T4 2 99 0 40 - 4 50 mIU/L   PSA, total and free   Result Value Ref Range    Prostate Specific Antigen Total 2 2 < OR = 4 0 ng/mL    PSA, Free 0 6 ng/mL    PSA, Free Pct 27 >25 % (calc)   Hemoglobin A1c (w/out EAG)   Result Value Ref Range    Hemoglobin A1C 6 0 (H) <5 7 % of total Hgb       Orders Placed This Encounter   Procedures    Cologuard    Hepatitis C antibody    Hemoglobin A1C    Comprehensive metabolic panel       ALLERGIES:  No Known Allergies    Current Medications     Current Outpatient Medications   Medication Sig Dispense Refill    amLODIPine (NORVASC) 10 mg tablet TAKE ONE TABLET DAILY 30 tablet 5    aspirin 81 MG tablet Take by mouth daily        atorvastatin (LIPITOR) 10 mg tablet TAKE 1 TABLET DAILY AT BEDTIME 30 tablet 5    benazepril (LOTENSIN) 20 mg tablet TAKE 2 TABLETS DAILY 60 tablet 5    carvedilol (COREG) 6 25 mg tablet Take 1 tablet (6 25 mg total) by mouth 2 (two) times a day with meals 60 tablet 11    Cholecalciferol (VITAMIN D) 2000 units tablet Take 2,000 Units by mouth daily        hydrochlorothiazide (HYDRODIURIL) 25 mg tablet TAKE 1 TABLET DAILY 30 tablet 5    ipratropium (ATROVENT) 0 06 % nasal spray 2 sprays into each nostril 3 (three) times a day as needed        Multiple Vitamin (MULTI-VITAMIN) tablet Take 1 tablet by mouth        No current facility-administered medications for this visit            Health Maintenance     Health Maintenance   Topic Date Due    Hepatitis C Screening  1955    BMI: Followup Plan  11/05/1975    CRC Screening: Annalisa  11/05/2007    INFLUENZA VACCINE  07/01/2019    Depression Screening PHQ  08/15/2019    BMI: Adult  08/21/2020    Pneumococcal Vaccine: 65+ Years (1 of 2 - PCV13) 11/05/2022    DTaP,Tdap,and Td Vaccines (2 - Td) 07/01/2025    Pneumococcal Vaccine: Pediatrics (0 to 5 Years) and At-Risk Patients (6 to 59 Years)  Aged Out    HEPATITIS B VACCINES  Aged [employer] History   Administered Date(s) Administered     Influenza (IM) Preservative Free 01/06/2016    Influenza Quadrivalent Preservative Free 3 years and older IM 01/18/2017    Influenza TIV (IM) 10/21/2014    Tdap 07/01/2015       Aaron Juares MD

## 2019-09-21 DIAGNOSIS — I10 ESSENTIAL HYPERTENSION: ICD-10-CM

## 2019-09-21 DIAGNOSIS — E78.5 HYPERLIPIDEMIA, UNSPECIFIED HYPERLIPIDEMIA TYPE: ICD-10-CM

## 2019-09-21 DIAGNOSIS — I10 HYPERTENSION, UNSPECIFIED TYPE: ICD-10-CM

## 2019-09-22 RX ORDER — AMLODIPINE BESYLATE 10 MG/1
TABLET ORAL
Qty: 30 TABLET | Refills: 5 | Status: SHIPPED | OUTPATIENT
Start: 2019-09-22 | End: 2020-03-20

## 2019-09-22 RX ORDER — HYDROCHLOROTHIAZIDE 25 MG/1
TABLET ORAL
Qty: 30 TABLET | Refills: 5 | Status: SHIPPED | OUTPATIENT
Start: 2019-09-22 | End: 2020-03-20

## 2019-09-22 RX ORDER — ATORVASTATIN CALCIUM 10 MG/1
TABLET, FILM COATED ORAL
Qty: 30 TABLET | Refills: 5 | Status: SHIPPED | OUTPATIENT
Start: 2019-09-22 | End: 2020-03-20

## 2019-09-23 RX ORDER — CARVEDILOL 6.25 MG/1
TABLET ORAL
Qty: 60 TABLET | Refills: 11 | Status: SHIPPED | OUTPATIENT
Start: 2019-09-23 | End: 2020-11-24 | Stop reason: SDUPTHER

## 2019-10-23 ENCOUNTER — IMMUNIZATIONS (OUTPATIENT)
Dept: FAMILY MEDICINE CLINIC | Facility: CLINIC | Age: 64
End: 2019-10-23
Payer: COMMERCIAL

## 2019-10-23 VITALS — TEMPERATURE: 97.7 F

## 2019-10-23 DIAGNOSIS — Z23 INFLUENZA VACCINE NEEDED: Primary | ICD-10-CM

## 2019-10-23 PROCEDURE — 90686 IIV4 VACC NO PRSV 0.5 ML IM: CPT

## 2019-10-23 PROCEDURE — 90471 IMMUNIZATION ADMIN: CPT

## 2020-02-21 DIAGNOSIS — I10 HYPERTENSION, UNSPECIFIED TYPE: ICD-10-CM

## 2020-02-21 LAB
ALBUMIN SERPL-MCNC: 3.8 G/DL (ref 3.6–5.1)
ALBUMIN/GLOB SERPL: 1.2 (CALC) (ref 1–2.5)
ALP SERPL-CCNC: 78 U/L (ref 35–144)
ALT SERPL-CCNC: 28 U/L (ref 9–46)
AST SERPL-CCNC: 17 U/L (ref 10–35)
BILIRUB SERPL-MCNC: 0.6 MG/DL (ref 0.2–1.2)
BUN SERPL-MCNC: 11 MG/DL (ref 7–25)
BUN/CREAT SERPL: ABNORMAL (CALC) (ref 6–22)
CALCIUM SERPL-MCNC: 9.1 MG/DL (ref 8.6–10.3)
CHLORIDE SERPL-SCNC: 102 MMOL/L (ref 98–110)
CO2 SERPL-SCNC: 27 MMOL/L (ref 20–32)
CREAT SERPL-MCNC: 0.73 MG/DL (ref 0.7–1.25)
GLOBULIN SER CALC-MCNC: 3.2 G/DL (CALC) (ref 1.9–3.7)
GLUCOSE SERPL-MCNC: 123 MG/DL (ref 65–99)
HBA1C MFR BLD: 6.1 % OF TOTAL HGB
HCV AB S/CO SERPL IA: 0.06
HCV AB SERPL QL IA: NORMAL
POTASSIUM SERPL-SCNC: 4.2 MMOL/L (ref 3.5–5.3)
PROT SERPL-MCNC: 7 G/DL (ref 6.1–8.1)
SL AMB EGFR AFRICAN AMERICAN: 115 ML/MIN/1.73M2
SL AMB EGFR NON AFRICAN AMERICAN: 99 ML/MIN/1.73M2
SODIUM SERPL-SCNC: 135 MMOL/L (ref 135–146)

## 2020-02-21 RX ORDER — BENAZEPRIL HYDROCHLORIDE 20 MG/1
TABLET ORAL
Qty: 60 TABLET | Refills: 5 | Status: SHIPPED | OUTPATIENT
Start: 2020-02-21 | End: 2020-08-24

## 2020-02-26 ENCOUNTER — OFFICE VISIT (OUTPATIENT)
Dept: FAMILY MEDICINE CLINIC | Facility: CLINIC | Age: 65
End: 2020-02-26
Payer: COMMERCIAL

## 2020-02-26 VITALS
DIASTOLIC BLOOD PRESSURE: 74 MMHG | BODY MASS INDEX: 36.77 KG/M2 | TEMPERATURE: 98.8 F | HEART RATE: 69 BPM | RESPIRATION RATE: 18 BRPM | HEIGHT: 69 IN | OXYGEN SATURATION: 97 % | SYSTOLIC BLOOD PRESSURE: 128 MMHG | WEIGHT: 248.25 LBS

## 2020-02-26 DIAGNOSIS — E55.9 VITAMIN D DEFICIENCY: ICD-10-CM

## 2020-02-26 DIAGNOSIS — E78.5 HYPERLIPIDEMIA, UNSPECIFIED HYPERLIPIDEMIA TYPE: ICD-10-CM

## 2020-02-26 DIAGNOSIS — Z00.00 ROUTINE HEALTH MAINTENANCE: ICD-10-CM

## 2020-02-26 DIAGNOSIS — Z12.5 PROSTATE CANCER SCREENING: ICD-10-CM

## 2020-02-26 DIAGNOSIS — R73.01 IMPAIRED FASTING GLUCOSE: ICD-10-CM

## 2020-02-26 DIAGNOSIS — R53.83 FATIGUE, UNSPECIFIED TYPE: ICD-10-CM

## 2020-02-26 DIAGNOSIS — I10 HYPERTENSION, UNSPECIFIED TYPE: Primary | ICD-10-CM

## 2020-02-26 PROCEDURE — 3008F BODY MASS INDEX DOCD: CPT | Performed by: FAMILY MEDICINE

## 2020-02-26 PROCEDURE — 3078F DIAST BP <80 MM HG: CPT | Performed by: FAMILY MEDICINE

## 2020-02-26 PROCEDURE — 3074F SYST BP LT 130 MM HG: CPT | Performed by: FAMILY MEDICINE

## 2020-02-26 PROCEDURE — 99214 OFFICE O/P EST MOD 30 MIN: CPT | Performed by: FAMILY MEDICINE

## 2020-02-26 PROCEDURE — 1036F TOBACCO NON-USER: CPT | Performed by: FAMILY MEDICINE

## 2020-02-26 NOTE — PROGRESS NOTES
FAMILY PRACTICE OFFICE VISIT       NAME: Radha Polanco  AGE: 60 y o  SEX: male       : 1955        MRN: [id number]    DATE: 2020  TIME: 2:30 PM    Assessment and Plan     Problem List Items Addressed This Visit        Endocrine    Impaired fasting glucose    Relevant Orders    Comprehensive metabolic panel    Hemoglobin A1C       Other    Routine health maintenance    Fatigue    Relevant Orders    CBC and differential    Comprehensive metabolic panel    TSH, 3rd generation with Free T4 reflex      Other Visit Diagnoses     Hypertension, unspecified type    -  Primary    Relevant Orders    Comprehensive metabolic panel    Lipid Panel with Direct LDL reflex    Hyperlipidemia, unspecified hyperlipidemia type        Relevant Orders    Comprehensive metabolic panel    Lipid Panel with Direct LDL reflex    TSH, 3rd generation with Free T4 reflex    Vitamin D deficiency        Prostate cancer screening        Relevant Orders    PSA, Total Screen          Hypertension:    Overall stable and controlled  Continue with amlodipine, carvedilol, benazepril, hydrochlorothiazide and lifestyle modifications  Have discussed the importance of losing weight and patient will start exercising  Hyperlipidemia/  Low HDL:    Have discussed the importance of increasing heart healthy fats in the diet by incorporating Mediterranean diet and starting routine exercise regimen in the past   Continue with atorvastatin and lifestyle modifications  Will check lipid panel prior to next appointment  Impaired fasting glucose: Will continue to monitor  Recent A1c noted to be 6 1  Vitamin-D deficiency:  Continue with vitamin-D supplementation  Will continue to monitor  Routine health maintenance:  Patient states he did send his colo guard kit however I have not received any results  We will call the company to inquire about the results    Have strongly encouraged on maintaining well balanced diet, getting adequate amount of sleep and starting routine exercise regimen to work on weight loss  Up-to-date with Tdap  Up-to-date with flu vaccine  There are no Patient Instructions on file for this visit  Chief Complaint     Chief Complaint   Patient presents with    Follow-up    Annual Exam       History of Present Illness     HPI   60-year-old male here for routine follow-up of chronic conditions and discuss recent blood work results  Patient states he is doing well overall  He denies a truck and fortunately does not have the upper chain exercise as much however he did recently purchased a stepper and will start to uses regularly  He has been trying to watch what he eats, limit sugar  He would like to lose weight and will also try and work on this as well  Review of Systems   Review of Systems   Constitutional: Negative for appetite change  Eyes: Negative for visual disturbance  Respiratory: Negative for shortness of breath  Cardiovascular: Negative  Gastrointestinal: Negative for abdominal pain and constipation  Genitourinary: Negative for dysuria  Neurological: Negative for dizziness and light-headedness  Psychiatric/Behavioral: Negative for dysphoric mood  Active Problem List     Patient Active Problem List   Diagnosis    Routine health maintenance    Essential hypertension    Dyslipidemia    Impaired fasting glucose    Fatigue    Screening for prostate cancer    Low HDL (under 40)       Past Medical History:  Past Medical History:   Diagnosis Date    Allergic rhinitis     last assessed: 3/10/2014    Insomnia     last assessed: 3/10/2014    Vitamin D deficiency     last assessed: 3/10/2014       Past Surgical History:  History reviewed  No pertinent surgical history      Family History:  Family History   Problem Relation Age of Onset    Pathological gambling Mother     Diabetes Father         mellitus    Ulcers Father         gastrkic    Stroke Brother         syndrome Social History:  Social History     Socioeconomic History    Marital status: Single     Spouse name: Not on file    Number of children: Not on file    Years of education: Not on file    Highest education level: Not on file   Occupational History    Not on file   Social Needs    Financial resource strain: Not on file    Food insecurity:     Worry: Not on file     Inability: Not on file    Transportation needs:     Medical: Not on file     Non-medical: Not on file   Tobacco Use    Smoking status: Never Smoker    Smokeless tobacco: Never Used   Substance and Sexual Activity    Alcohol use: No    Drug use: No    Sexual activity: Not on file   Lifestyle    Physical activity:     Days per week: Not on file     Minutes per session: Not on file    Stress: Not on file   Relationships    Social connections:     Talks on phone: Not on file     Gets together: Not on file     Attends Restorationism service: Not on file     Active member of club or organization: Not on file     Attends meetings of clubs or organizations: Not on file     Relationship status: Not on file    Intimate partner violence:     Fear of current or ex partner: Not on file     Emotionally abused: Not on file     Physically abused: Not on file     Forced sexual activity: Not on file   Other Topics Concern    Not on file   Social History Narrative    Not on file     I have reviewed the patient's medical history in detail; there are no changes to the history as noted in the electronic medical record      Objective     Vitals:    02/26/20 1407   BP: 128/74   Pulse:    Resp:    Temp:    SpO2:      Wt Readings from Last 3 Encounters:   02/26/20 113 kg (248 lb 4 oz)   08/21/19 113 kg (248 lb 12 8 oz)   02/20/19 113 kg (248 lb 6 4 oz)     Vitals:    02/26/20 1323 02/26/20 1407   BP: 130/80 128/74   BP Location: Left arm    Patient Position: Sitting    Cuff Size: Adult    Pulse: 69    Resp: 18    Temp: 98 8 °F (37 1 °C)    TempSrc: Tympanic SpO2: 97%    Weight: 113 kg (248 lb 4 oz)    Height: 5' 9" (1 753 m)        Physical Exam   Constitutional: He appears well-developed and well-nourished  HENT:   Head: Normocephalic and atraumatic  Mouth/Throat: Oropharynx is clear and moist    TMs intact and clear   Eyes: Pupils are equal, round, and reactive to light  Conjunctivae and EOM are normal    Neck: Normal range of motion  Neck supple  No thyromegaly present  Cardiovascular: Normal rate, regular rhythm and normal heart sounds  Pulmonary/Chest: Effort normal and breath sounds normal    Abdominal: Soft  Bowel sounds are normal  He exhibits no distension  There is no tenderness  Musculoskeletal: Normal range of motion  He exhibits no edema  Lymphadenopathy:     He has no cervical adenopathy  Neurological: He is alert  Psychiatric: He has a normal mood and affect  Nursing note and vitals reviewed        Pertinent Laboratory/Diagnostic Studies:  Lab Results   Component Value Date    BUN 11 02/20/2020    CREATININE 0 73 02/20/2020    CALCIUM 9 1 02/20/2020     07/21/2017    K 4 2 02/20/2020    CO2 27 02/20/2020     02/20/2020     Lab Results   Component Value Date    ALT 28 02/20/2020    AST 17 02/20/2020    ALKPHOS 78 02/20/2020    BILITOT 0 8 07/21/2017       Lab Results   Component Value Date    WBC 10 3 08/13/2019    HGB 13 7 08/13/2019    HCT 41 0 08/13/2019    MCV 89 7 08/13/2019     08/13/2019       No results found for: TSH    Lab Results   Component Value Date    CHOL 205 (H) 07/21/2017     Lab Results   Component Value Date    TRIG 177 (H) 08/13/2019     Lab Results   Component Value Date    HDL 34 (L) 08/13/2019     No results found for: [address]  Lab Results   Component Value Date    HGBA1C 6 1 (H) 02/20/2020       Results for orders placed or performed in visit on 02/20/20   Comprehensive metabolic panel   Result Value Ref Range    Glucose, Random 123 (H) 65 - 99 mg/dL    BUN 11 7 - 25 mg/dL    Creatinine 0 73 0 70 - 1 25 mg/dL    eGFR Non  99 > OR = 60 mL/min/1 73m2    eGFR  115 > OR = 60 mL/min/1 73m2    SL AMB BUN/CREATININE RATIO NOT APPLICABLE 6 - 22 (calc)    Sodium 135 135 - 146 mmol/L    Potassium 4 2 3 5 - 5 3 mmol/L    Chloride 102 98 - 110 mmol/L    CO2 27 20 - 32 mmol/L    SL AMB CALCIUM 9 1 8 6 - 10 3 mg/dL    Protein, Total 7 0 6 1 - 8 1 g/dL    Albumin 3 8 3 6 - 5 1 g/dL    Globulin 3 2 1 9 - 3 7 g/dL (calc)    Albumin/Globulin Ratio 1 2 1 0 - 2 5 (calc)    TOTAL BILIRUBIN 0 6 0 2 - 1 2 mg/dL    Alkaline Phosphatase 78 35 - 144 U/L    AST 17 10 - 35 U/L    ALT 28 9 - 46 U/L   Hepatitis C Ab W/Refl To HCV RNA, Qn, PCR   Result Value Ref Range    HEP C AB NON-REACTIVE NON-REACTIVE    Signal to Cut-Off 0 06 <1 00   Hemoglobin A1c (w/out EAG)   Result Value Ref Range    Hemoglobin A1C 6 1 (H) <5 7 % of total Hgb       Orders Placed This Encounter   Procedures    CBC and differential    Comprehensive metabolic panel    Lipid Panel with Direct LDL reflex    TSH, 3rd generation with Free T4 reflex    Hemoglobin A1C    PSA, Total Screen       ALLERGIES:  No Known Allergies    Current Medications     Current Outpatient Medications   Medication Sig Dispense Refill    amLODIPine (NORVASC) 10 mg tablet TAKE ONE TABLET DAILY 30 tablet 5    aspirin 81 MG tablet Take by mouth daily        atorvastatin (LIPITOR) 10 mg tablet TAKE 1 TABLET DAILY AT BEDTIME 30 tablet 5    benazepril (LOTENSIN) 20 mg tablet TAKE 2 TABLETS DAILY 60 tablet 5    carvedilol (COREG) 6 25 mg tablet TAKE 1 TABLET TWICE DAILY 60 tablet 11    Cholecalciferol (VITAMIN D) 2000 units tablet Take 2,000 Units by mouth daily        hydrochlorothiazide (HYDRODIURIL) 25 mg tablet TAKE 1 TABLET DAILY 30 tablet 5    ipratropium (ATROVENT) 0 06 % nasal spray 2 sprays into each nostril 3 (three) times a day as needed        Multiple Vitamin (MULTI-VITAMIN) tablet Take 1 tablet by mouth        No current facility-administered medications for this visit            Health Maintenance     Health Maintenance   Topic Date Due    HIV Screening  11/05/1972    BMI: Followup Plan  11/05/1975    CRC Screening: Cologuard  11/05/2007    Annual Physical  02/14/2019    Depression Screening PHQ  02/26/2021    BMI: Adult  02/26/2021    Pneumococcal Vaccine: 65+ Years (1 of 2 - PCV13) 11/05/2022    DTaP,Tdap,and Td Vaccines (2 - Td) 07/01/2025    Hepatitis C Screening  Completed    Influenza Vaccine  Completed    Pneumococcal Vaccine: Pediatrics (0 to 5 Years) and At-Risk Patients (6 to 59 Years)  Aged Out    HIB Vaccine  Aged Out    Hepatitis B Vaccine  Aged Out    IPV Vaccine  Aged Out    Hepatitis A Vaccine  Aged Out    Meningococcal ACWY Vaccine  Aged Out    HPV Vaccine  Aged [employer] History   Administered Date(s) Administered     Influenza (IM) Preservative Free 01/06/2016    Influenza Quadrivalent Preservative Free 3 years and older IM 01/18/2017    Influenza TIV (IM) 10/21/2014    Influenza, injectable, quadrivalent, preservative free 0 5 mL 10/23/2019    Tdap 07/01/2015       Maria Dolores Davenport MD

## 2020-03-20 DIAGNOSIS — E78.5 HYPERLIPIDEMIA, UNSPECIFIED HYPERLIPIDEMIA TYPE: ICD-10-CM

## 2020-03-20 DIAGNOSIS — I10 HYPERTENSION, UNSPECIFIED TYPE: ICD-10-CM

## 2020-03-20 RX ORDER — ATORVASTATIN CALCIUM 10 MG/1
TABLET, FILM COATED ORAL
Qty: 30 TABLET | Refills: 5 | Status: SHIPPED | OUTPATIENT
Start: 2020-03-20 | End: 2020-09-21

## 2020-03-20 RX ORDER — HYDROCHLOROTHIAZIDE 25 MG/1
TABLET ORAL
Qty: 30 TABLET | Refills: 5 | Status: SHIPPED | OUTPATIENT
Start: 2020-03-20 | End: 2020-09-21

## 2020-03-20 RX ORDER — AMLODIPINE BESYLATE 10 MG/1
TABLET ORAL
Qty: 30 TABLET | Refills: 5 | Status: SHIPPED | OUTPATIENT
Start: 2020-03-20 | End: 2020-09-21

## 2020-08-22 DIAGNOSIS — I10 HYPERTENSION, UNSPECIFIED TYPE: ICD-10-CM

## 2020-08-24 RX ORDER — BENAZEPRIL HYDROCHLORIDE 20 MG/1
TABLET ORAL
Qty: 60 TABLET | Refills: 5 | Status: SHIPPED | OUTPATIENT
Start: 2020-08-24 | End: 2020-10-07

## 2020-09-08 ENCOUNTER — OFFICE VISIT (OUTPATIENT)
Dept: FAMILY MEDICINE CLINIC | Facility: CLINIC | Age: 65
End: 2020-09-08
Payer: COMMERCIAL

## 2020-09-08 DIAGNOSIS — Z23 FLU VACCINE NEED: ICD-10-CM

## 2020-09-08 DIAGNOSIS — Z00.00 ANNUAL PHYSICAL EXAM: Primary | ICD-10-CM

## 2020-09-08 DIAGNOSIS — E78.5 DYSLIPIDEMIA: ICD-10-CM

## 2020-09-08 DIAGNOSIS — I10 ESSENTIAL HYPERTENSION: ICD-10-CM

## 2020-09-08 DIAGNOSIS — L98.9 SKIN LESION: ICD-10-CM

## 2020-09-08 LAB
SL AMB  POCT GLUCOSE, UA: NORMAL
SL AMB LEUKOCYTE ESTERASE,UA: NORMAL
SL AMB POCT BILIRUBIN,UA: NORMAL
SL AMB POCT BLOOD,UA: NORMAL
SL AMB POCT CLARITY,UA: CLEAR
SL AMB POCT COLOR,UA: YELLOW
SL AMB POCT KETONES,UA: NORMAL
SL AMB POCT NITRITE,UA: NORMAL
SL AMB POCT PH,UA: 5
SL AMB POCT SPECIFIC GRAVITY,UA: 1.01
SL AMB POCT URINE PROTEIN: NORMAL
SL AMB POCT UROBILINOGEN: 0.2

## 2020-09-08 PROCEDURE — 1036F TOBACCO NON-USER: CPT | Performed by: NURSE PRACTITIONER

## 2020-09-08 PROCEDURE — 90682 RIV4 VACC RECOMBINANT DNA IM: CPT | Performed by: NURSE PRACTITIONER

## 2020-09-08 PROCEDURE — 81003 URINALYSIS AUTO W/O SCOPE: CPT | Performed by: NURSE PRACTITIONER

## 2020-09-08 PROCEDURE — 99396 PREV VISIT EST AGE 40-64: CPT | Performed by: NURSE PRACTITIONER

## 2020-09-08 PROCEDURE — 3079F DIAST BP 80-89 MM HG: CPT | Performed by: NURSE PRACTITIONER

## 2020-09-08 PROCEDURE — 3725F SCREEN DEPRESSION PERFORMED: CPT | Performed by: NURSE PRACTITIONER

## 2020-09-08 PROCEDURE — 90471 IMMUNIZATION ADMIN: CPT | Performed by: NURSE PRACTITIONER

## 2020-09-08 NOTE — PROGRESS NOTES
FAMILY PRACTICE OFFICE VISIT       NAME: Madison Guerrero  AGE: 60 y o  SEX: male       : 1955        MRN: [id number]    Assessment and Plan     Problem List Items Addressed This Visit        Cardiovascular and Mediastinum    Essential hypertension       Other    Dyslipidemia      Other Visit Diagnoses     Annual physical exam    -  Primary    Relevant Orders    POCT urine dip auto non-scope (Completed)    Skin lesion        Relevant Orders    Ambulatory referral to Dermatology    Flu vaccine need        Relevant Orders    POCT urine dip auto non-scope (Completed)    influenza vaccine, quadrivalent, recombinant, PF, 0 5 mL, for patients 18 yr+ (FLUBLOK) (Completed)          1  Annual physical exam  POCT urine dip auto non-scope   2  Skin lesion  Ambulatory referral to Dermatology   3  Flu vaccine need  POCT urine dip auto non-scope    influenza vaccine, quadrivalent, recombinant, PF, 0 5 mL, for patients 18 yr+ (FLUBLOK)   4  Essential hypertension     5  Dyslipidemia       This 60-year-old male presents today for annual physical exam   Due for blood work, which he plans to complete  Flu vaccination administered today  Tdap vaccination is up-to-date  Due for PSA, has previous prescription for this  States he completed Cologuard last year, but never heard results  Will have office try to contact Micheal Jaramillo regarding this  Discussed with patient, he may need to repeat test   Declines HIV screening, states he has not been sexually active since age 34  Blood pressure is stable on current medications, blood pressure in office today initially 140/88, with repeat 128/84  Will continue atorvastatin 10 mg daily  He is due for repeat lipid panel  Skin lesion on right anterior ankle/lower leg noted on exam   Unclear etiology of skin lesion skin lesion has enlarged, and rubs on his socks and his boots  Has to keep covered with a Band-Aid    I have recommended he follow-up with Dermatology regarding this skin lesion  He is agreeable will schedule  Name and number provided today  He will complete his routine blood work  He will follow-up with Dr Bonilla Rubio in 6 months  Chief Complaint     Chief Complaint   Patient presents with    Well Check    Follow-up       History of Present Illness     Janet Ramos is a 60year old male presenting today for annual physical exam     He is a   Planning to retire at age 79  Does not exercise  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  Works 6 days per week, 10 hour days  He is compliant with medications  Avoids salty foods  Does not check his blood pressure at home  States he is feeling well and has no complaints today  Review of Systems   Review of Systems   Constitutional: Negative  HENT: Negative  Eyes: Negative  Respiratory: Negative  Cardiovascular: Negative  Gastrointestinal: Negative  Endocrine: Negative  Genitourinary: Negative  Musculoskeletal: Negative  Skin: Negative  Allergic/Immunologic: Negative  Neurological: Negative  Hematological: Negative  Psychiatric/Behavioral: Negative  Active Problem List     Patient Active Problem List   Diagnosis    Routine health maintenance    Essential hypertension    Dyslipidemia    Impaired fasting glucose    Fatigue    Screening for prostate cancer    Low HDL (under 40)       Past Medical History:  Past Medical History:   Diagnosis Date    Allergic rhinitis     last assessed: 3/10/2014    Insomnia     last assessed: 3/10/2014    Vitamin D deficiency     last assessed: 3/10/2014       Past Surgical History:  History reviewed  No pertinent surgical history      Family History:  Family History   Problem Relation Age of Onset   [place] Pathological gambling Mother     Diabetes Father         mellitus    Ulcers Father         gastrkic    Stroke Brother         syndrome       Social History:  Social History     Socioeconomic History    Marital status: Single     Spouse name: Not on file    Number of children: Not on file    Years of education: Not on file    Highest education level: Not on file   Occupational History    Not on file   Social Needs    Financial resource strain: Not on file    Food insecurity     Worry: Not on file     Inability: Not on file    Transportation needs     Medical: Not on file     Non-medical: Not on file   Tobacco Use    Smoking status: Never Smoker    Smokeless tobacco: Never Used   Substance and Sexual Activity    Alcohol use: No    Drug use: No    Sexual activity: Not on file   Lifestyle    Physical activity     Days per week: Not on file     Minutes per session: Not on file    Stress: Not on file   Relationships    Social connections     Talks on phone: Not on file     Gets together: Not on file     Attends Zoroastrian service: Not on file     Active member of club or organization: Not on file     Attends meetings of clubs or organizations: Not on file     Relationship status: Not on file    Intimate partner violence     Fear of current or ex partner: Not on file     Emotionally abused: Not on file     Physically abused: Not on file     Forced sexual activity: Not on file   Other Topics Concern    Not on file   Social History Narrative    Not on file       I have reviewed the patient's medical history in detail; there are no changes to the history as noted in the electronic medical record  Objective     Vitals:    09/08/20 1102 09/08/20 1203   BP: 140/88 128/84   Pulse: 73    Resp: 19    Temp: 97 6 °F (36 4 °C)    SpO2: 96%    Weight: 113 kg (249 lb)    Height: 5' 9" (1 753 m)      Wt Readings from Last 3 Encounters:   09/08/20 113 kg (249 lb)   02/26/20 113 kg (248 lb 4 oz)   08/21/19 113 kg (248 lb 12 8 oz)     Physical Exam  Vitals signs and nursing note reviewed  Constitutional:       General: He is not in acute distress  Appearance: Normal appearance  He is well-developed  He is obese  He is not ill-appearing, toxic-appearing or diaphoretic  HENT:      Head: Normocephalic and atraumatic  Right Ear: Tympanic membrane and ear canal normal       Left Ear: Tympanic membrane and ear canal normal    Eyes:      Conjunctiva/sclera: Conjunctivae normal       Pupils: Pupils are equal, round, and reactive to light  Neck:      Musculoskeletal: Normal range of motion and neck supple  Thyroid: No thyromegaly  Vascular: No carotid bruit  Cardiovascular:      Rate and Rhythm: Normal rate and regular rhythm  Heart sounds: No murmur  Pulmonary:      Effort: Pulmonary effort is normal  No respiratory distress  Breath sounds: Normal breath sounds  No wheezing or rales  Abdominal:      General: Bowel sounds are normal       Palpations: Abdomen is soft  Tenderness: There is no abdominal tenderness  Comments: Abdomen obese   Musculoskeletal:      Right lower leg: No edema  Left lower leg: No edema  Feet:    Lymphadenopathy:      Cervical: No cervical adenopathy  Skin:     Findings: No rash  Neurological:      Mental Status: He is alert and oriented to person, place, and time  Psychiatric:         Mood and Affect: Mood normal          Behavior: Behavior normal        BMI Counseling: Body mass index is 22 06 kg/m²  The BMI is above normal  Nutrition recommendations include encouraging healthy choices of fruits and vegetables, moderation in carbohydrate intake and increasing intake of lean protein  Exercise recommendations include moderate physical activity 150 minutes/week             ALLERGIES:  No Known Allergies    Current Medications     Current Outpatient Medications   Medication Sig Dispense Refill    amLODIPine (NORVASC) 10 mg tablet TAKE ONE TABLET DAILY 30 tablet 5    aspirin 81 MG tablet Take by mouth daily        atorvastatin (LIPITOR) 10 mg tablet TAKE 1 TABLET DAILY AT BEDTIME 30 tablet 5    benazepril (LOTENSIN) 20 mg tablet TAKE 2 TABLETS DAILY 60 tablet 5    carvedilol (COREG) 6 25 mg tablet TAKE 1 TABLET TWICE DAILY 60 tablet 11    Cholecalciferol (VITAMIN D) 2000 units tablet Take 2,000 Units by mouth daily        hydrochlorothiazide (HYDRODIURIL) 25 mg tablet TAKE 1 TABLET DAILY 30 tablet 5    ipratropium (ATROVENT) 0 06 % nasal spray 2 sprays into each nostril 3 (three) times a day as needed        Multiple Vitamin (MULTI-VITAMIN) tablet Take 1 tablet by mouth        No current facility-administered medications for this visit            Health Maintenance     Health Maintenance   Topic Date Due    HIV Screening  11/05/1972    BMI: Followup Plan  11/05/1975    Colorectal Cancer Screening  11/05/2007    Annual Physical  02/14/2019    Depression Screening PHQ  09/08/2021    BMI: Adult  09/08/2021    DTaP,Tdap,and Td Vaccines (2 - Td) 07/01/2025    Hepatitis C Screening  Completed    Influenza Vaccine  Completed    Pneumococcal Vaccine: Pediatrics (0 to 5 Years) and At-Risk Patients (6 to 59 Years)  Aged Out    HIB Vaccine  Aged Out    Hepatitis B Vaccine  Aged Out    IPV Vaccine  Aged Out    Hepatitis A Vaccine  Aged Out    Meningococcal ACWY Vaccine  Aged Out    HPV Vaccine  Aged [employer] History   Administered Date(s) Administered     Influenza (IM) Preservative Free 01/06/2016    Influenza Quadrivalent Preservative Free 3 years and older IM 01/18/2017    Influenza TIV (IM) 10/21/2014    Influenza, injectable, quadrivalent, preservative free 0 5 mL 10/23/2019    Influenza, recombinant, quadrivalent,injectable, preservative free 09/08/2020    Tdap 07/01/2015       SEGUNDO Darby

## 2020-09-12 ENCOUNTER — TELEPHONE (OUTPATIENT)
Dept: FAMILY MEDICINE CLINIC | Facility: CLINIC | Age: 65
End: 2020-09-12

## 2020-09-12 VITALS
SYSTOLIC BLOOD PRESSURE: 128 MMHG | TEMPERATURE: 97.6 F | RESPIRATION RATE: 19 BRPM | WEIGHT: 249 LBS | DIASTOLIC BLOOD PRESSURE: 84 MMHG | BODY MASS INDEX: 36.88 KG/M2 | HEART RATE: 73 BPM | OXYGEN SATURATION: 96 % | HEIGHT: 69 IN

## 2020-09-21 DIAGNOSIS — I10 HYPERTENSION, UNSPECIFIED TYPE: ICD-10-CM

## 2020-09-21 DIAGNOSIS — E78.5 HYPERLIPIDEMIA, UNSPECIFIED HYPERLIPIDEMIA TYPE: ICD-10-CM

## 2020-09-21 RX ORDER — AMLODIPINE BESYLATE 10 MG/1
TABLET ORAL
Qty: 30 TABLET | Refills: 5 | Status: SHIPPED | OUTPATIENT
Start: 2020-09-21 | End: 2021-04-02

## 2020-09-21 RX ORDER — ATORVASTATIN CALCIUM 10 MG/1
TABLET, FILM COATED ORAL
Qty: 30 TABLET | Refills: 5 | Status: SHIPPED | OUTPATIENT
Start: 2020-09-21 | End: 2021-04-02

## 2020-09-21 RX ORDER — HYDROCHLOROTHIAZIDE 25 MG/1
TABLET ORAL
Qty: 30 TABLET | Refills: 5 | Status: SHIPPED | OUTPATIENT
Start: 2020-09-21 | End: 2021-04-02

## 2020-10-05 DIAGNOSIS — I10 HYPERTENSION, UNSPECIFIED TYPE: ICD-10-CM

## 2020-10-07 RX ORDER — BENAZEPRIL HYDROCHLORIDE 20 MG/1
TABLET ORAL
Qty: 60 TABLET | Refills: 5 | Status: SHIPPED | OUTPATIENT
Start: 2020-10-07 | End: 2020-10-27 | Stop reason: SDUPTHER

## 2020-10-27 DIAGNOSIS — I10 HYPERTENSION, UNSPECIFIED TYPE: ICD-10-CM

## 2020-10-27 RX ORDER — BENAZEPRIL HYDROCHLORIDE 20 MG/1
40 TABLET ORAL DAILY
Qty: 60 TABLET | Refills: 5 | Status: SHIPPED | OUTPATIENT
Start: 2020-10-27 | End: 2021-10-01

## 2020-11-24 DIAGNOSIS — I10 ESSENTIAL HYPERTENSION: ICD-10-CM

## 2020-11-24 RX ORDER — CARVEDILOL 6.25 MG/1
6.25 TABLET ORAL 2 TIMES DAILY
Qty: 60 TABLET | Refills: 0 | Status: SHIPPED | OUTPATIENT
Start: 2020-11-24 | End: 2020-12-28 | Stop reason: SDUPTHER

## 2020-11-25 ENCOUNTER — TELEPHONE (OUTPATIENT)
Dept: CARDIOLOGY CLINIC | Facility: CLINIC | Age: 65
End: 2020-11-25

## 2020-12-28 DIAGNOSIS — I10 ESSENTIAL HYPERTENSION: ICD-10-CM

## 2020-12-28 RX ORDER — CARVEDILOL 6.25 MG/1
6.25 TABLET ORAL 2 TIMES DAILY
Qty: 60 TABLET | Refills: 3 | Status: SHIPPED | OUTPATIENT
Start: 2020-12-28 | End: 2021-04-02

## 2021-03-23 ENCOUNTER — OFFICE VISIT (OUTPATIENT)
Dept: FAMILY MEDICINE CLINIC | Facility: CLINIC | Age: 66
End: 2021-03-23
Payer: COMMERCIAL

## 2021-03-23 VITALS
OXYGEN SATURATION: 96 % | WEIGHT: 244.25 LBS | SYSTOLIC BLOOD PRESSURE: 132 MMHG | DIASTOLIC BLOOD PRESSURE: 78 MMHG | HEIGHT: 69 IN | TEMPERATURE: 98 F | RESPIRATION RATE: 18 BRPM | HEART RATE: 68 BPM | BODY MASS INDEX: 36.18 KG/M2

## 2021-03-23 DIAGNOSIS — I10 HYPERTENSION, UNSPECIFIED TYPE: Primary | ICD-10-CM

## 2021-03-23 DIAGNOSIS — Z00.00 ROUTINE HEALTH MAINTENANCE: ICD-10-CM

## 2021-03-23 DIAGNOSIS — E55.9 VITAMIN D DEFICIENCY: ICD-10-CM

## 2021-03-23 DIAGNOSIS — R53.83 FATIGUE, UNSPECIFIED TYPE: ICD-10-CM

## 2021-03-23 DIAGNOSIS — E78.5 HYPERLIPIDEMIA, UNSPECIFIED HYPERLIPIDEMIA TYPE: ICD-10-CM

## 2021-03-23 DIAGNOSIS — R73.01 IMPAIRED FASTING GLUCOSE: ICD-10-CM

## 2021-03-23 DIAGNOSIS — Z12.5 PROSTATE CANCER SCREENING: ICD-10-CM

## 2021-03-23 PROCEDURE — 99214 OFFICE O/P EST MOD 30 MIN: CPT | Performed by: FAMILY MEDICINE

## 2021-03-23 RX ORDER — CHLORAL HYDRATE 500 MG
1000 CAPSULE ORAL 2 TIMES DAILY
COMMUNITY

## 2021-03-23 RX ORDER — PHENOL 1.4 %
660 AEROSOL, SPRAY (ML) MUCOUS MEMBRANE DAILY
COMMUNITY

## 2021-03-23 NOTE — PROGRESS NOTES
FAMILY PRACTICE OFFICE VISIT       NAME: Tracey Govea  AGE: 63 y o  SEX: male       : 1955        MRN: [id number]    DATE: 3/23/2021  TIME: 2:18 PM    Assessment and Plan     Problem List Items Addressed This Visit        Endocrine    Impaired fasting glucose    Relevant Orders    CBC and differential    Comprehensive metabolic panel    Hemoglobin A1C       Cardiovascular and Mediastinum    Hypertension - Primary    Relevant Orders    CBC and differential    Comprehensive metabolic panel       Other    Fatigue    Relevant Orders    CBC and differential    TSH, 3rd generation with Free T4 reflex    Hyperlipidemia    Relevant Orders    Lipid Panel with Direct LDL reflex    CBC and differential    TSH, 3rd generation with Free T4 reflex    Comprehensive metabolic panel      Other Visit Diagnoses     Prostate cancer screening        Relevant Orders    PSA, Total Screen        Hypertension:  BP initially elevated, however upon recheck decreased and almost within normal range  I would like him to continue to work on limiting sodium, working on weight loss and starting regular exercise  I would like him to monitor BP at home  If it is above 130/80, I would like him to return or go to Cardiology  Continue with amlodipine, carvedilol, benazepril, hydrochlorothiazide and lifestyle modifications  Hyperlipidemia/  Low HDL:  Have discussed the importance of increasing heart healthy fats in the diet by incorporating Mediterranean diet and starting routine exercise regimen in the past   Continue with atorvastatin and lifestyle modifications  Will check lipid panel  Recommend starting Co Q10  Impaired fasting glucose: Will continue to monitor  Vitamin-D deficiency:  Continue with vitamin-D supplementation  Will continue to monitor  Routine health maintenance:  Up-to-date with colo guard done in 2019, negative    Have strongly encouraged on maintaining well balanced diet, getting adequate amount of sleep and starting routine exercise regimen to work on weight loss  Up-to-date with Tdap  Will check PSA  BMI Counseling: Body mass index is 36 07 kg/m²  The BMI is above normal  Nutrition recommendations include reducing portion sizes and increasing intake of lean protein  Exercise recommendations include moderate aerobic physical activity for 150 minutes/week  There are no Patient Instructions on file for this visit  Chief Complaint     Chief Complaint   Patient presents with    Follow-up     6 month        History of Present Illness     HPI     62-year-old male presents today for routine follow-up of chronic conditions  He will have blood work done  He is looking into purchasing a treadmill for his apartment to start exercising  He has been trying to eat healthy as well  Otherwise denies any complaints  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  Review of Systems   Review of Systems   Constitutional: Negative for appetite change  Respiratory: Negative for shortness of breath  Cardiovascular: Negative  Gastrointestinal: Negative for abdominal pain  Neurological: Negative for dizziness  Psychiatric/Behavioral: Negative for dysphoric mood  Active Problem List     Patient Active Problem List   Diagnosis    Routine health maintenance    Hypertension    Dyslipidemia    Impaired fasting glucose    Fatigue    Screening for prostate cancer    Low HDL (under 40)    Hyperlipidemia       Past Medical History:  Past Medical History:   Diagnosis Date    Allergic rhinitis     last assessed: 3/10/2014    Insomnia     last assessed: 3/10/2014    Vitamin D deficiency     last assessed: 3/10/2014       Past Surgical History:  History reviewed  No pertinent surgical history      Family History:  Family History   Problem Relation Age of Onset   Lange Pathological gambling Mother     Diabetes Father         mellitus    Ulcers Father         gastrkic    Stroke Brother         syndrome       Social History:  Social History     Socioeconomic History    Marital status: Single     Spouse name: Not on file    Number of children: Not on file    Years of education: Not on file    Highest education level: Not on file   Occupational History    Not on file   Social Needs    Financial resource strain: Not on file    Food insecurity     Worry: Not on file     Inability: Not on file   [employer] needs     Medical: Not on file     Non-medical: Not on file   Tobacco Use    Smoking status: Never Smoker    Smokeless tobacco: Never Used   Substance and Sexual Activity    Alcohol use: No    Drug use: No    Sexual activity: Not on file   Lifestyle    Physical activity     Days per week: Not on file     Minutes per session: Not on file    Stress: Not on file   Relationships    Social connections     Talks on phone: Not on file     Gets together: Not on file     Attends [employer] service: Not on file     Active member of club or organization: Not on file     Attends meetings of clubs or organizations: Not on file     Relationship status: Not on file    Intimate partner violence     Fear of current or ex partner: Not on file     Emotionally abused: Not on file     Physically abused: Not on file     Forced sexual activity: Not on file   Other Topics Concern    Not on file   Social History Narrative    Not on file     I have reviewed the patient's medical history in detail; there are no changes to the history as noted in the electronic medical record      Objective     Vitals:    03/23/21 1418   BP: 132/78   Pulse:    Resp:    Temp:    SpO2:      Wt Readings from Last 3 Encounters:   03/23/21 111 kg (244 lb 4 oz)   09/08/20 113 kg (249 lb)   02/26/20 113 kg (248 lb 4 oz)     PHQ-9 Depression Screening    PHQ-9:   Frequency of the following problems over the past two weeks:      Little interest or pleasure in doing things: 0 - not at all  Feeling down, depressed, or hopeless: 0 - not at all  PHQ-2 Score: 0           Physical Exam  Vitals signs and nursing note reviewed  Constitutional:       General: He is not in acute distress  Appearance: Normal appearance  He is well-developed  HENT:      Head: Normocephalic and atraumatic  Right Ear: Tympanic membrane normal       Left Ear: Tympanic membrane normal       Mouth/Throat:      Mouth: Mucous membranes are moist       Pharynx: Oropharynx is clear  Eyes:      Conjunctiva/sclera: Conjunctivae normal       Pupils: Pupils are equal, round, and reactive to light  Neck:      Musculoskeletal: Normal range of motion and neck supple  Thyroid: No thyromegaly  Cardiovascular:      Rate and Rhythm: Normal rate and regular rhythm  Pulmonary:      Effort: Pulmonary effort is normal       Breath sounds: Normal breath sounds  Abdominal:      General: Bowel sounds are normal       Palpations: Abdomen is soft  Tenderness: There is no abdominal tenderness  Musculoskeletal: Normal range of motion  General: No swelling  Lymphadenopathy:      Cervical: No cervical adenopathy  Neurological:      Mental Status: He is alert and oriented to person, place, and time     Psychiatric:         Mood and Affect: Mood normal          Pertinent Laboratory/Diagnostic Studies:  Lab Results   Component Value Date    BUN 11 02/20/2020    CREATININE 0 73 02/20/2020    CALCIUM 9 1 02/20/2020     07/21/2017    K 4 2 02/20/2020    CO2 27 02/20/2020     02/20/2020     Lab Results   Component Value Date    ALT 28 02/20/2020    AST 17 02/20/2020    ALKPHOS 78 02/20/2020    BILITOT 0 8 07/21/2017       Lab Results   Component Value Date    WBC 10 3 08/13/2019    HGB 13 7 08/13/2019    HCT 41 0 08/13/2019    MCV 89 7 08/13/2019     08/13/2019       No results found for: TSH    Lab Results   Component Value Date    CHOL 205 (H) 07/21/2017     Lab Results   Component Value Date    TRIG 177 (H) 08/13/2019     Lab Results   Component Value Date    HDL 34 (L) 08/13/2019     Lab Results   Component Value Date    LDLCALC 96 08/13/2019     Lab Results   Component Value Date    HGBA1C 6 1 (H) 02/20/2020       Results for orders placed or performed in visit on 09/08/20   POCT urine dip auto non-scope   Result Value Ref Range     COLOR,UA yellow     CLARITY,UA clear     SPECIFIC GRAVITY,UA 1 015      PH,UA 5 0     LEUKOCYTE ESTERASE,UA neg     NITRITE,UA neg     GLUCOSE, UA neg     KETONES,UA neg     BILIRUBIN,UA neg     BLOOD,UA neg     POCT URINE PROTEIN neg     SL AMB POCT UROBILINOGEN 0 2        Orders Placed This Encounter   Procedures    PSA, Total Screen    Lipid Panel with Direct LDL reflex    CBC and differential    TSH, 3rd generation with Free T4 reflex    Comprehensive metabolic panel    Hemoglobin A1C       ALLERGIES:  No Known Allergies    Current Medications     Current Outpatient Medications   Medication Sig Dispense Refill    amLODIPine (NORVASC) 10 mg tablet TAKE ONE TABLET DAILY 30 tablet 5    aspirin 81 MG tablet Take by mouth daily        atorvastatin (LIPITOR) 10 mg tablet TAKE 1 TABLET DAILY AT BEDTIME 30 tablet 5    benazepril (LOTENSIN) 20 mg tablet Take 2 tablets (40 mg total) by mouth daily 60 tablet 5    carvedilol (COREG) 6 25 mg tablet Take 1 tablet (6 25 mg total) by mouth 2 (two) times a day 60 tablet 3    Cholecalciferol (VITAMIN D) 2000 units tablet Take 2,000 Units by mouth daily        hydrochlorothiazide (HYDRODIURIL) 25 mg tablet TAKE 1 TABLET DAILY 30 tablet 5    ipratropium (ATROVENT) 0 06 % nasal spray 2 sprays into each nostril 3 (three) times a day as needed        Multiple Vitamin (MULTI-VITAMIN) tablet Take 1 tablet by mouth        No current facility-administered medications for this visit            Health Maintenance     Health Maintenance   Topic Date Due    HIV Screening  Never done    COVID-19 Vaccine (1) Never done    BMI: Followup Plan  09/12/2021   Elaine Landry Annual Physical  09/08/2021    Depression Screening PHQ  03/23/2022    BMI: Adult  03/23/2022    Colorectal Cancer Screening  08/28/2022    DTaP,Tdap,and Td Vaccines (2 - Td) 07/01/2025    Hepatitis C Screening  Completed    Influenza Vaccine  Completed    Pneumococcal Vaccine: Pediatrics (0 to 5 Years) and At-Risk Patients (6 to 59 Years)  Aged Out    HIB Vaccine  Aged Out    Hepatitis B Vaccine  Aged Out    IPV Vaccine  Aged Out    Hepatitis A Vaccine  Aged Out    Meningococcal ACWY Vaccine  Aged Out    HPV Vaccine  Aged [employer] History   Administered Date(s) Administered    Influenza Quadrivalent Preservative Free 3 years and older IM 01/18/2017    Influenza, injectable, quadrivalent, preservative free 0 5 mL 10/23/2019    Influenza, recombinant, quadrivalent,injectable, preservative free 09/08/2020    Influenza, seasonal, injectable 10/21/2014    Influenza, seasonal, injectable, preservative free 01/06/2016    Tdap 07/01/2015       Tae Martinez MD

## 2021-04-01 DIAGNOSIS — I10 HYPERTENSION, UNSPECIFIED TYPE: ICD-10-CM

## 2021-04-01 DIAGNOSIS — I10 ESSENTIAL HYPERTENSION: ICD-10-CM

## 2021-04-01 DIAGNOSIS — E78.5 HYPERLIPIDEMIA, UNSPECIFIED HYPERLIPIDEMIA TYPE: ICD-10-CM

## 2021-04-02 RX ORDER — CARVEDILOL 6.25 MG/1
6.25 TABLET ORAL 2 TIMES DAILY
Qty: 60 TABLET | Refills: 3 | Status: SHIPPED | OUTPATIENT
Start: 2021-04-02 | End: 2021-08-02

## 2021-04-02 RX ORDER — ATORVASTATIN CALCIUM 10 MG/1
TABLET, FILM COATED ORAL
Qty: 30 TABLET | Refills: 5 | Status: SHIPPED | OUTPATIENT
Start: 2021-04-02 | End: 2021-09-28 | Stop reason: ALTCHOICE

## 2021-04-02 RX ORDER — AMLODIPINE BESYLATE 10 MG/1
TABLET ORAL
Qty: 30 TABLET | Refills: 5 | Status: SHIPPED | OUTPATIENT
Start: 2021-04-02 | End: 2021-10-01

## 2021-04-02 RX ORDER — HYDROCHLOROTHIAZIDE 25 MG/1
TABLET ORAL
Qty: 30 TABLET | Refills: 5 | Status: SHIPPED | OUTPATIENT
Start: 2021-04-02 | End: 2021-10-01

## 2021-07-31 DIAGNOSIS — I10 ESSENTIAL HYPERTENSION: ICD-10-CM

## 2021-08-02 RX ORDER — CARVEDILOL 6.25 MG/1
6.25 TABLET ORAL 2 TIMES DAILY
Qty: 60 TABLET | Refills: 3 | Status: SHIPPED | OUTPATIENT
Start: 2021-08-02 | End: 2021-11-30

## 2021-09-20 LAB
ALBUMIN SERPL-MCNC: 4.1 G/DL (ref 3.6–5.1)
ALBUMIN/GLOB SERPL: 1.2 (CALC) (ref 1–2.5)
ALP SERPL-CCNC: 98 U/L (ref 35–144)
ALT SERPL-CCNC: 37 U/L (ref 9–46)
AST SERPL-CCNC: 29 U/L (ref 10–35)
BASOPHILS # BLD AUTO: 46 CELLS/UL (ref 0–200)
BASOPHILS NFR BLD AUTO: 0.4 %
BILIRUB SERPL-MCNC: 0.8 MG/DL (ref 0.2–1.2)
BUN SERPL-MCNC: 17 MG/DL (ref 7–25)
BUN/CREAT SERPL: ABNORMAL (CALC) (ref 6–22)
CALCIUM SERPL-MCNC: 9.8 MG/DL (ref 8.6–10.3)
CHLORIDE SERPL-SCNC: 101 MMOL/L (ref 98–110)
CHOLEST SERPL-MCNC: 182 MG/DL
CHOLEST/HDLC SERPL: 4.4 (CALC)
CO2 SERPL-SCNC: 26 MMOL/L (ref 20–32)
CREAT SERPL-MCNC: 0.75 MG/DL (ref 0.7–1.25)
EOSINOPHIL # BLD AUTO: 150 CELLS/UL (ref 15–500)
EOSINOPHIL NFR BLD AUTO: 1.3 %
ERYTHROCYTE [DISTWIDTH] IN BLOOD BY AUTOMATED COUNT: 12.5 % (ref 11–15)
GLOBULIN SER CALC-MCNC: 3.4 G/DL (CALC) (ref 1.9–3.7)
GLUCOSE SERPL-MCNC: 129 MG/DL (ref 65–99)
HBA1C MFR BLD: 5.9 % OF TOTAL HGB
HCT VFR BLD AUTO: 45.6 % (ref 38.5–50)
HDLC SERPL-MCNC: 41 MG/DL
HGB BLD-MCNC: 15.8 G/DL (ref 13.2–17.1)
LDLC SERPL CALC-MCNC: 109 MG/DL (CALC)
LYMPHOCYTES # BLD AUTO: 1622 CELLS/UL (ref 850–3900)
LYMPHOCYTES NFR BLD AUTO: 14.1 %
MCH RBC QN AUTO: 30.2 PG (ref 27–33)
MCHC RBC AUTO-ENTMCNC: 34.6 G/DL (ref 32–36)
MCV RBC AUTO: 87.2 FL (ref 80–100)
MONOCYTES # BLD AUTO: 679 CELLS/UL (ref 200–950)
MONOCYTES NFR BLD AUTO: 5.9 %
NEUTROPHILS # BLD AUTO: 9005 CELLS/UL (ref 1500–7800)
NEUTROPHILS NFR BLD AUTO: 78.3 %
NONHDLC SERPL-MCNC: 141 MG/DL (CALC)
PLATELET # BLD AUTO: 238 THOUSAND/UL (ref 140–400)
PMV BLD REES-ECKER: 12.1 FL (ref 7.5–12.5)
POTASSIUM SERPL-SCNC: 4.1 MMOL/L (ref 3.5–5.3)
PROT SERPL-MCNC: 7.5 G/DL (ref 6.1–8.1)
PSA SERPL-MCNC: 2.51 NG/ML
RBC # BLD AUTO: 5.23 MILLION/UL (ref 4.2–5.8)
SL AMB EGFR AFRICAN AMERICAN: 113 ML/MIN/1.73M2
SL AMB EGFR NON AFRICAN AMERICAN: 98 ML/MIN/1.73M2
SODIUM SERPL-SCNC: 137 MMOL/L (ref 135–146)
TRIGL SERPL-MCNC: 197 MG/DL
TSH SERPL-ACNC: 2.27 MIU/L (ref 0.4–4.5)
WBC # BLD AUTO: 11.5 THOUSAND/UL (ref 3.8–10.8)

## 2021-09-28 ENCOUNTER — OFFICE VISIT (OUTPATIENT)
Dept: FAMILY MEDICINE CLINIC | Facility: CLINIC | Age: 66
End: 2021-09-28
Payer: COMMERCIAL

## 2021-09-28 VITALS
HEART RATE: 68 BPM | TEMPERATURE: 97.1 F | WEIGHT: 242 LBS | HEIGHT: 69 IN | OXYGEN SATURATION: 95 % | BODY MASS INDEX: 35.84 KG/M2 | RESPIRATION RATE: 18 BRPM | SYSTOLIC BLOOD PRESSURE: 128 MMHG | DIASTOLIC BLOOD PRESSURE: 76 MMHG

## 2021-09-28 DIAGNOSIS — I10 HYPERTENSION, UNSPECIFIED TYPE: Primary | ICD-10-CM

## 2021-09-28 DIAGNOSIS — E78.5 HYPERLIPIDEMIA, UNSPECIFIED HYPERLIPIDEMIA TYPE: ICD-10-CM

## 2021-09-28 DIAGNOSIS — D72.829 LEUKOCYTOSIS, UNSPECIFIED TYPE: ICD-10-CM

## 2021-09-28 DIAGNOSIS — R73.01 IMPAIRED FASTING GLUCOSE: ICD-10-CM

## 2021-09-28 DIAGNOSIS — Z23 INFLUENZA VACCINE NEEDED: ICD-10-CM

## 2021-09-28 PROCEDURE — 90682 RIV4 VACC RECOMBINANT DNA IM: CPT | Performed by: NURSE PRACTITIONER

## 2021-09-28 PROCEDURE — 99214 OFFICE O/P EST MOD 30 MIN: CPT | Performed by: NURSE PRACTITIONER

## 2021-09-28 PROCEDURE — 90471 IMMUNIZATION ADMIN: CPT | Performed by: NURSE PRACTITIONER

## 2021-09-28 RX ORDER — ATORVASTATIN CALCIUM 20 MG/1
20 TABLET, FILM COATED ORAL DAILY
Qty: 90 TABLET | Refills: 1 | Status: SHIPPED | OUTPATIENT
Start: 2021-09-28

## 2021-10-01 DIAGNOSIS — E78.5 HYPERLIPIDEMIA, UNSPECIFIED HYPERLIPIDEMIA TYPE: ICD-10-CM

## 2021-10-01 DIAGNOSIS — I10 HYPERTENSION, UNSPECIFIED TYPE: ICD-10-CM

## 2021-10-01 RX ORDER — ATORVASTATIN CALCIUM 10 MG/1
TABLET, FILM COATED ORAL
Qty: 30 TABLET | Refills: 5 | Status: SHIPPED | OUTPATIENT
Start: 2021-10-01 | End: 2022-03-29 | Stop reason: SDUPTHER

## 2021-10-01 RX ORDER — AMLODIPINE BESYLATE 10 MG/1
TABLET ORAL
Qty: 30 TABLET | Refills: 5 | Status: SHIPPED | OUTPATIENT
Start: 2021-10-01 | End: 2022-04-05

## 2021-10-01 RX ORDER — BENAZEPRIL HYDROCHLORIDE 20 MG/1
40 TABLET ORAL DAILY
Qty: 60 TABLET | Refills: 5 | Status: SHIPPED | OUTPATIENT
Start: 2021-10-01 | End: 2022-04-05

## 2021-10-01 RX ORDER — HYDROCHLOROTHIAZIDE 25 MG/1
TABLET ORAL
Qty: 30 TABLET | Refills: 5 | Status: SHIPPED | OUTPATIENT
Start: 2021-10-01 | End: 2022-04-05

## 2021-11-30 DIAGNOSIS — I10 ESSENTIAL HYPERTENSION: ICD-10-CM

## 2021-11-30 RX ORDER — CARVEDILOL 6.25 MG/1
6.25 TABLET ORAL 2 TIMES DAILY
Qty: 60 TABLET | Refills: 3 | Status: SHIPPED | OUTPATIENT
Start: 2021-11-30 | End: 2022-04-04

## 2022-02-17 ENCOUNTER — TELEPHONE (OUTPATIENT)
Dept: DERMATOLOGY | Facility: CLINIC | Age: 67
End: 2022-02-17

## 2022-03-29 ENCOUNTER — OFFICE VISIT (OUTPATIENT)
Dept: FAMILY MEDICINE CLINIC | Facility: CLINIC | Age: 67
End: 2022-03-29
Payer: COMMERCIAL

## 2022-03-29 VITALS
HEIGHT: 69 IN | SYSTOLIC BLOOD PRESSURE: 150 MMHG | WEIGHT: 240 LBS | BODY MASS INDEX: 35.55 KG/M2 | RESPIRATION RATE: 18 BRPM | OXYGEN SATURATION: 96 % | HEART RATE: 70 BPM | TEMPERATURE: 97.8 F | DIASTOLIC BLOOD PRESSURE: 88 MMHG

## 2022-03-29 DIAGNOSIS — I10 HYPERTENSION, UNSPECIFIED TYPE: Primary | ICD-10-CM

## 2022-03-29 DIAGNOSIS — R73.01 IMPAIRED FASTING GLUCOSE: ICD-10-CM

## 2022-03-29 DIAGNOSIS — E78.5 HYPERLIPIDEMIA, UNSPECIFIED HYPERLIPIDEMIA TYPE: ICD-10-CM

## 2022-03-29 PROCEDURE — 1036F TOBACCO NON-USER: CPT | Performed by: NURSE PRACTITIONER

## 2022-03-29 PROCEDURE — 3077F SYST BP >= 140 MM HG: CPT | Performed by: NURSE PRACTITIONER

## 2022-03-29 PROCEDURE — 3008F BODY MASS INDEX DOCD: CPT | Performed by: NURSE PRACTITIONER

## 2022-03-29 PROCEDURE — 99214 OFFICE O/P EST MOD 30 MIN: CPT | Performed by: NURSE PRACTITIONER

## 2022-03-29 PROCEDURE — 3079F DIAST BP 80-89 MM HG: CPT | Performed by: NURSE PRACTITIONER

## 2022-03-29 PROCEDURE — 3725F SCREEN DEPRESSION PERFORMED: CPT | Performed by: NURSE PRACTITIONER

## 2022-03-29 NOTE — PROGRESS NOTES
FAMILY PRACTICE OFFICE VISIT       NAME: Kike Hendrix  AGE: 61 y o  SEX: male       : 1955        MRN: [id number]    Assessment and Plan   1  Hypertension, unspecified type  Assessment & Plan:  BP is elevated today  Has not taken his blood pressure medications yet today  Not checking home blood pressures, recommend to get a home BP cuff, and call if BP consistently >140/90  Last BP check in   Will continue to monitor  2  Hyperlipidemia, unspecified hyperlipidemia type  Assessment & Plan:  Increased atorvastatin to 20 mg daily at last office visit, due for repeat blood work  3  Impaired fasting glucose  Assessment & Plan:  A1c 5 9%  Will monitor  Complete blood work as soon as possible  Return in 6 months for recheck, or sooner if needed  Chief Complaint     Chief Complaint   Patient presents with    Follow-up     Pt is here for 6 mos f/u       History of Present Illness     Kike Hendrix is a 61year old male presenting today for check up on hypertension  Feeling well  Has no complaints today  Review of Systems   Review of Systems   Constitutional: Negative  HENT: Negative  Respiratory: Negative  Cardiovascular: Negative  Gastrointestinal: Negative  Genitourinary: Negative  Musculoskeletal: Negative  Skin: Negative  Neurological: Negative  Hematological: Negative  Psychiatric/Behavioral: Negative  I have reviewed the patient's medical history in detail; there are no changes to the history as noted in the electronic medical record      Objective     Vitals:    22 1121 22 1151   BP: 160/70 150/88   Pulse: 70    Resp: 18    Temp: 97 8 °F (36 6 °C)    TempSrc: Temporal    SpO2: 96%    Weight: 109 kg (240 lb)    Height: 5' 9" (1 753 m)      Wt Readings from Last 3 Encounters:   22 109 kg (240 lb)   21 110 kg (242 lb)   21 111 kg (244 lb 4 oz)     Physical Exam  Vitals and nursing note reviewed  Constitutional:       General: He is not in acute distress  Appearance: Normal appearance  He is well-developed  He is not ill-appearing  HENT:      Head: Normocephalic and atraumatic  Eyes:      Conjunctiva/sclera: Conjunctivae normal       Pupils: Pupils are equal, round, and reactive to light  Neck:      Thyroid: No thyromegaly  Cardiovascular:      Rate and Rhythm: Normal rate and regular rhythm  Heart sounds: Murmur heard  Systolic murmur is present with a grade of 2/6  Pulmonary:      Effort: Pulmonary effort is normal  No respiratory distress  Breath sounds: Normal breath sounds  No wheezing or rales  Musculoskeletal:      Cervical back: Normal range of motion and neck supple  Right lower leg: No edema  Left lower leg: No edema  Lymphadenopathy:      Cervical: No cervical adenopathy  Skin:     Findings: No rash  Neurological:      Mental Status: He is alert and oriented to person, place, and time  Psychiatric:         Mood and Affect: Mood normal        BMI Counseling: Body mass index is 35 44 kg/m²  The BMI is above normal  Nutrition recommendations include encouraging healthy choices of fruits and vegetables, moderation in carbohydrate intake and increasing intake of lean protein  Exercise recommendations include exercising 3-5 times per week  Rationale for BMI follow-up plan is due to patient being overweight or obese  Depression Screening and Follow-up Plan: Patient was screened for depression during today's encounter  They screened negative with a PHQ-2 score of 0          ALLERGIES:  No Known Allergies    Current Medications     Current Outpatient Medications   Medication Sig Dispense Refill    amLODIPine (NORVASC) 10 mg tablet TAKE ONE TABLET DAILY 30 tablet 5    aspirin 81 MG tablet Take by mouth daily        atorvastatin (LIPITOR) 20 mg tablet Take 1 tablet (20 mg total) by mouth daily 90 tablet 1    benazepril (LOTENSIN) 20 mg tablet TAKE 2 TABLETS (40 MG TOTAL) BY MOUTH DAILY 60 tablet 5    calcium carbonate (OS-HORACE) 600 MG tablet Take 660 mg by mouth daily      carvedilol (COREG) 6 25 mg tablet TAKE 1 TABLET (6 25 MG TOTAL) BY MOUTH 2 (TWO) TIMES A DAY 60 tablet 3    Cholecalciferol (VITAMIN D) 2000 units tablet Take 5,000 Units by mouth daily       hydrochlorothiazide (HYDRODIURIL) 25 mg tablet TAKE 1 TABLET DAILY 30 tablet 5    ipratropium (ATROVENT) 0 06 % nasal spray 2 sprays into each nostril 3 (three) times a day as needed        magnesium gluconate (MAGONATE) 30 MG tablet Take 30 mg by mouth 2 (two) times a day      Multiple Vitamin (MULTI-VITAMIN) tablet Take 1 tablet by mouth       Omega-3 Fatty Acids (fish oil) 1,000 mg Take 1,000 mg by mouth 2 (two) times a day       No current facility-administered medications for this visit           Health Maintenance     Health Maintenance   Topic Date Due    HIV Screening  Never done    Annual Physical  09/08/2021    COVID-19 Vaccine (3 - Booster for Moderna series) 12/13/2021    BMI: Followup Plan  04/01/2022    Colorectal Cancer Screening  08/28/2022    Depression Screening  03/29/2023    BMI: Adult  03/29/2023    DTaP,Tdap,and Td Vaccines (2 - Td or Tdap) 07/01/2025    Hepatitis C Screening  Completed    Influenza Vaccine  Completed    Pneumococcal Vaccine: Pediatrics (0 to 5 Years) and At-Risk Patients (6 to 59 Years)  Aged Out    HIB Vaccine  Aged Out    Hepatitis B Vaccine  Aged Out    IPV Vaccine  Aged Out    Hepatitis A Vaccine  Aged Out    Meningococcal ACWY Vaccine  Aged Out    HPV Vaccine  Aged [employer] History   Administered Date(s) Administered    COVID-19 MODERNA VACC 0 5 ML IM 06/15/2021, 07/13/2021    Influenza Quadrivalent Preservative Free 3 years and older IM 01/18/2017    Influenza, injectable, quadrivalent, preservative free 0 5 mL 10/23/2019    Influenza, recombinant, quadrivalent,injectable, preservative free 09/08/2020, 09/28/2021    Influenza, seasonal, injectable 10/21/2014    Influenza, seasonal, injectable, preservative free 01/06/2016    Tdap 07/01/2015       SEGUNDO Landry WDL

## 2022-03-29 NOTE — ASSESSMENT & PLAN NOTE
BP is elevated today  Has not taken his blood pressure medications yet today  Not checking home blood pressures, recommend to get a home BP cuff, and call if BP consistently >140/90  Last BP check in September 128/76  Will continue to monitor

## 2022-04-04 DIAGNOSIS — E78.5 HYPERLIPIDEMIA, UNSPECIFIED HYPERLIPIDEMIA TYPE: ICD-10-CM

## 2022-04-04 DIAGNOSIS — I10 ESSENTIAL HYPERTENSION: ICD-10-CM

## 2022-04-04 DIAGNOSIS — I10 HYPERTENSION, UNSPECIFIED TYPE: ICD-10-CM

## 2022-04-04 RX ORDER — CARVEDILOL 6.25 MG/1
6.25 TABLET ORAL 2 TIMES DAILY
Qty: 60 TABLET | Refills: 3 | Status: SHIPPED | OUTPATIENT
Start: 2022-04-04 | End: 2022-08-06

## 2022-04-05 RX ORDER — ATORVASTATIN CALCIUM 10 MG/1
TABLET, FILM COATED ORAL
Qty: 30 TABLET | Refills: 5 | Status: SHIPPED | OUTPATIENT
Start: 2022-04-05

## 2022-04-05 RX ORDER — AMLODIPINE BESYLATE 10 MG/1
TABLET ORAL
Qty: 30 TABLET | Refills: 5 | Status: SHIPPED | OUTPATIENT
Start: 2022-04-05

## 2022-04-05 RX ORDER — BENAZEPRIL HYDROCHLORIDE 20 MG/1
40 TABLET ORAL DAILY
Qty: 60 TABLET | Refills: 5 | Status: SHIPPED | OUTPATIENT
Start: 2022-04-05

## 2022-04-05 RX ORDER — HYDROCHLOROTHIAZIDE 25 MG/1
TABLET ORAL
Qty: 30 TABLET | Refills: 5 | Status: SHIPPED | OUTPATIENT
Start: 2022-04-05

## 2022-08-06 DIAGNOSIS — I10 ESSENTIAL HYPERTENSION: ICD-10-CM

## 2022-08-06 RX ORDER — CARVEDILOL 6.25 MG/1
6.25 TABLET ORAL 2 TIMES DAILY
Qty: 60 TABLET | Refills: 3 | Status: SHIPPED | OUTPATIENT
Start: 2022-08-06

## 2022-09-28 ENCOUNTER — OFFICE VISIT (OUTPATIENT)
Dept: FAMILY MEDICINE CLINIC | Facility: CLINIC | Age: 67
End: 2022-09-28

## 2022-09-28 VITALS
DIASTOLIC BLOOD PRESSURE: 86 MMHG | RESPIRATION RATE: 18 BRPM | HEART RATE: 82 BPM | HEIGHT: 69 IN | OXYGEN SATURATION: 97 % | TEMPERATURE: 97.6 F | WEIGHT: 242 LBS | SYSTOLIC BLOOD PRESSURE: 148 MMHG | BODY MASS INDEX: 35.84 KG/M2

## 2022-09-28 DIAGNOSIS — I10 HYPERTENSION, UNSPECIFIED TYPE: Primary | ICD-10-CM

## 2022-09-28 DIAGNOSIS — Z12.5 PROSTATE CANCER SCREENING: ICD-10-CM

## 2022-09-28 DIAGNOSIS — R73.01 IMPAIRED FASTING GLUCOSE: ICD-10-CM

## 2022-09-28 DIAGNOSIS — E78.5 HYPERLIPIDEMIA, UNSPECIFIED HYPERLIPIDEMIA TYPE: ICD-10-CM

## 2022-09-28 DIAGNOSIS — E66.01 CLASS 2 SEVERE OBESITY DUE TO EXCESS CALORIES WITH SERIOUS COMORBIDITY AND BODY MASS INDEX (BMI) OF 35.0 TO 35.9 IN ADULT (HCC): ICD-10-CM

## 2022-09-28 PROBLEM — E66.812 CLASS 2 SEVERE OBESITY DUE TO EXCESS CALORIES WITH SERIOUS COMORBIDITY AND BODY MASS INDEX (BMI) OF 35.0 TO 35.9 IN ADULT (HCC): Status: ACTIVE | Noted: 2022-09-28

## 2022-09-28 PROCEDURE — 99213 OFFICE O/P EST LOW 20 MIN: CPT | Performed by: NURSE PRACTITIONER

## 2022-09-28 NOTE — ASSESSMENT & PLAN NOTE
BP is mildly elevated today, did not take medications yet today  Typically takes them in the morning, but forgot this morning  Will continue to monitor

## 2022-09-28 NOTE — PROGRESS NOTES
FAMILY PRACTICE OFFICE VISIT       NAME: Satinder Ford  AGE: 61 y o  SEX: male       : 1955        MRN: [id number]    Assessment and Plan   1  Hypertension, unspecified type  Assessment & Plan:  BP is mildly elevated today, did not take medications yet today  Typically takes them in the morning, but forgot this morning  Will continue to monitor  Orders:  -     CBC; Future  -     Comprehensive metabolic panel; Future  -     TSH, 3rd generation; Future    2  Hyperlipidemia, unspecified hyperlipidemia type  Assessment & Plan:   with last lipid panel in March  Will repeat lipid panel in November, after he obtains medicare  Orders:  -     Lipid panel; Future    3  Impaired fasting glucose  Assessment & Plan:  Check A1c with next blood work  Last A1c 5 9%      Orders:  -     Hemoglobin A1C; Future    4  Prostate cancer screening  -     PSA, Total Screen; Future    5  Class 2 severe obesity due to excess calories with serious comorbidity and body mass index (BMI) of 35 0 to 35 9 in adult [facility])  Assessment & Plan: Will work on [employer], now that he is retired from , will be less tempted to eat convenience foods  Encouraged to start walking every day for exercise  Follow up in 6 months or sooner if needed  Chief Complaint     Chief Complaint   Patient presents with    Follow-up     Pt is here for 6 mos f/u       History of Present Illness     Satinder Ford is a 61year old male presenting today for follow up on hypertension  Just retired after 31 years of   Just became too stressful  Will be eligible for health insurance in November  Currently uninsured  Due for blood work, will complete this after medicare starts  Feels well and has no complaints today  Due for repeat cologuard--will do after medicare starts  Review of Systems   Review of Systems   Constitutional: Negative  HENT: Negative  Respiratory: Negative  Cardiovascular: Negative  Gastrointestinal: Negative  Genitourinary: Negative  Musculoskeletal: Negative  Skin: Negative  Neurological: Negative  Hematological: Negative  Psychiatric/Behavioral: Negative  I have reviewed the patient's medical history in detail; there are no changes to the history as noted in the electronic medical record  Objective     Vitals:    09/28/22 1130 09/28/22 1213   BP: 160/90 148/86   Pulse: 82    Resp: 18    Temp: 97 6 °F (36 4 °C)    TempSrc: Temporal    SpO2: 97%    Weight: 110 kg (242 lb)    Height: 5' 9" (1 753 m)      Wt Readings from Last 3 Encounters:   09/28/22 110 kg (242 lb)   03/29/22 109 kg (240 lb)   09/28/21 110 kg (242 lb)     Physical Exam  Vitals and nursing note reviewed  Constitutional:       General: He is not in acute distress  Appearance: Normal appearance  He is not ill-appearing  HENT:      Head: Atraumatic  Right Ear: Tympanic membrane normal       Left Ear: Tympanic membrane normal       Mouth/Throat:      Mouth: Mucous membranes are moist       Pharynx: Oropharynx is clear  Eyes:      Conjunctiva/sclera: Conjunctivae normal       Pupils: Pupils are equal, round, and reactive to light  Neck:      Thyroid: No thyromegaly  Vascular: No carotid bruit  Cardiovascular:      Rate and Rhythm: Normal rate and regular rhythm  Heart sounds: No murmur heard  Pulmonary:      Effort: Pulmonary effort is normal  No respiratory distress  Breath sounds: Normal breath sounds  Abdominal:      General: Bowel sounds are normal       Palpations: Abdomen is soft  Tenderness: There is no abdominal tenderness  Musculoskeletal:      Cervical back: Normal range of motion and neck supple  Right lower leg: No edema  Left lower leg: No edema  Lymphadenopathy:      Cervical: No cervical adenopathy  Skin:     Findings: No rash  Neurological:      Mental Status: He is alert     Psychiatric: Mood and Affect: Mood normal             ALLERGIES:  No Known Allergies    Current Medications     Current Outpatient Medications   Medication Sig Dispense Refill    amLODIPine (NORVASC) 10 mg tablet TAKE ONE TABLET DAILY 30 tablet 5    aspirin 81 MG tablet Take by mouth daily        atorvastatin (LIPITOR) 10 mg tablet TAKE 1 TABLET DAILY AT BEDTIME 30 tablet 5    atorvastatin (LIPITOR) 20 mg tablet Take 1 tablet (20 mg total) by mouth daily 90 tablet 1    benazepril (LOTENSIN) 20 mg tablet TAKE 2 TABLETS (40 MG TOTAL) BY MOUTH DAILY 60 tablet 5    calcium carbonate (OS-HORACE) 600 MG tablet Take 660 mg by mouth daily      carvedilol (COREG) 6 25 mg tablet TAKE 1 TABLET (6 25 MG TOTAL) BY MOUTH 2 (TWO) TIMES A DAY 60 tablet 3    Cholecalciferol (VITAMIN D) 2000 units tablet Take 5,000 Units by mouth daily       hydrochlorothiazide (HYDRODIURIL) 25 mg tablet TAKE 1 TABLET DAILY 30 tablet 5    ipratropium (ATROVENT) 0 06 % nasal spray 2 sprays into each nostril 3 (three) times a day as needed        magnesium gluconate (MAGONATE) 30 MG tablet Take 30 mg by mouth 2 (two) times a day      Multiple Vitamin (MULTI-VITAMIN) tablet Take 1 tablet by mouth       Omega-3 Fatty Acids (fish oil) 1,000 mg Take 1,000 mg by mouth 2 (two) times a day       No current facility-administered medications for this visit           Health Maintenance     Health Maintenance   Topic Date Due    HIV Screening  Never done    Annual Physical  09/08/2021    COVID-19 Vaccine (3 - Booster for Moderna series) 12/13/2021    Colorectal Cancer Screening  08/28/2022    Influenza Vaccine (1) 09/01/2022    Depression Screening  03/29/2023    BMI: Followup Plan  03/29/2023    BMI: Adult  09/28/2023    DTaP,Tdap,and Td Vaccines (2 - Td or Tdap) 07/01/2025    Hepatitis C Screening  Completed    Pneumococcal Vaccine: Pediatrics (0 to 5 Years) and At-Risk Patients (6 to 59 Years)  Aged Out    HIB Vaccine  Aged Out    Hepatitis B Vaccine Aged Out    IPV Vaccine  Aged Out    Hepatitis A Vaccine  Aged Out    Meningococcal ACWY Vaccine  Aged Out    HPV Vaccine  Aged Out     Immunization History   Administered Date(s) Administered    COVID-19 MODERNA VACC 0 5 ML IM 06/15/2021, 07/13/2021    Influenza Quadrivalent Preservative Free 3 years and older IM 01/18/2017    Influenza, injectable, quadrivalent, preservative free 0 5 mL 10/23/2019    Influenza, recombinant, quadrivalent,injectable, preservative free 09/08/2020, 09/28/2021    Influenza, seasonal, injectable 10/21/2014    Influenza, seasonal, injectable, preservative free 01/06/2016    Tdap 07/01/2015       SEGUNDO Billy

## 2022-09-28 NOTE — ASSESSMENT & PLAN NOTE
Will work on [employer], now that he is retired from , will be less tempted to eat convenience foods  Encouraged to start walking every day for exercise

## 2022-10-20 DIAGNOSIS — Z12.11 SCREENING FOR COLON CANCER: Primary | ICD-10-CM

## 2022-11-07 DIAGNOSIS — E78.5 HYPERLIPIDEMIA, UNSPECIFIED HYPERLIPIDEMIA TYPE: ICD-10-CM

## 2022-11-07 DIAGNOSIS — I10 HYPERTENSION, UNSPECIFIED TYPE: ICD-10-CM

## 2022-11-08 RX ORDER — HYDROCHLOROTHIAZIDE 25 MG/1
TABLET ORAL
Qty: 30 TABLET | Refills: 5 | Status: SHIPPED | OUTPATIENT
Start: 2022-11-08

## 2022-11-08 RX ORDER — ATORVASTATIN CALCIUM 10 MG/1
TABLET, FILM COATED ORAL
Qty: 30 TABLET | Refills: 5 | Status: SHIPPED | OUTPATIENT
Start: 2022-11-08

## 2022-11-08 RX ORDER — AMLODIPINE BESYLATE 10 MG/1
TABLET ORAL
Qty: 30 TABLET | Refills: 5 | Status: SHIPPED | OUTPATIENT
Start: 2022-11-08

## 2022-11-08 RX ORDER — BENAZEPRIL HYDROCHLORIDE 20 MG/1
40 TABLET ORAL DAILY
Qty: 60 TABLET | Refills: 5 | Status: SHIPPED | OUTPATIENT
Start: 2022-11-08

## 2023-01-10 DIAGNOSIS — I10 ESSENTIAL HYPERTENSION: ICD-10-CM

## 2023-01-10 RX ORDER — CARVEDILOL 6.25 MG/1
6.25 TABLET ORAL 2 TIMES DAILY
Qty: 60 TABLET | Refills: 3 | Status: SHIPPED | OUTPATIENT
Start: 2023-01-10

## 2023-01-24 ENCOUNTER — TELEPHONE (OUTPATIENT)
Dept: ADMINISTRATIVE | Facility: OTHER | Age: 68
End: 2023-01-24

## 2023-01-24 NOTE — TELEPHONE ENCOUNTER
01/24/23 2:12 PM    The patient was called and a message was left to call the ordering provider's office regarding an open order  Thank you    Rola Guidry MA  [facility]

## 2023-03-22 ENCOUNTER — RA CDI HCC (OUTPATIENT)
Dept: OTHER | Facility: HOSPITAL | Age: 68
End: 2023-03-22

## 2023-03-22 NOTE — PROGRESS NOTES
[address]  coding opportunities       Chart reviewed, no opportunity found: CHART REVIEWED, NO OPPORTUNITY FOUND        Patients Insurance     Medicare Insurance: Medicare

## 2023-03-28 ENCOUNTER — OFFICE VISIT (OUTPATIENT)
Dept: FAMILY MEDICINE CLINIC | Facility: CLINIC | Age: 68
End: 2023-03-28

## 2023-03-28 VITALS
HEART RATE: 77 BPM | RESPIRATION RATE: 18 BRPM | WEIGHT: 243.5 LBS | SYSTOLIC BLOOD PRESSURE: 133 MMHG | TEMPERATURE: 97.5 F | OXYGEN SATURATION: 98 % | HEIGHT: 69 IN | DIASTOLIC BLOOD PRESSURE: 68 MMHG | BODY MASS INDEX: 36.07 KG/M2

## 2023-03-28 DIAGNOSIS — R73.01 IMPAIRED FASTING GLUCOSE: ICD-10-CM

## 2023-03-28 DIAGNOSIS — Z00.00 MEDICARE ANNUAL WELLNESS VISIT, INITIAL: Primary | ICD-10-CM

## 2023-03-28 DIAGNOSIS — I10 HYPERTENSION, UNSPECIFIED TYPE: ICD-10-CM

## 2023-03-28 DIAGNOSIS — E78.5 HYPERLIPIDEMIA, UNSPECIFIED HYPERLIPIDEMIA TYPE: ICD-10-CM

## 2023-03-28 DIAGNOSIS — Z12.5 PROSTATE CANCER SCREENING: ICD-10-CM

## 2023-03-28 DIAGNOSIS — E66.01 CLASS 2 SEVERE OBESITY DUE TO EXCESS CALORIES WITH SERIOUS COMORBIDITY AND BODY MASS INDEX (BMI) OF 35.0 TO 35.9 IN ADULT (HCC): ICD-10-CM

## 2023-03-28 RX ORDER — ATORVASTATIN CALCIUM 20 MG/1
20 TABLET, FILM COATED ORAL DAILY
Qty: 30 TABLET | Refills: 11 | Status: SHIPPED | OUTPATIENT
Start: 2023-03-28

## 2023-03-28 RX ORDER — AMLODIPINE BESYLATE 10 MG/1
10 TABLET ORAL DAILY
Qty: 30 TABLET | Refills: 11 | Status: SHIPPED | OUTPATIENT
Start: 2023-03-28

## 2023-03-28 RX ORDER — HYDROCHLOROTHIAZIDE 25 MG/1
25 TABLET ORAL DAILY
Qty: 30 TABLET | Refills: 11 | Status: SHIPPED | OUTPATIENT
Start: 2023-03-28

## 2023-03-28 RX ORDER — CARVEDILOL 6.25 MG/1
6.25 TABLET ORAL 2 TIMES DAILY
Qty: 60 TABLET | Refills: 11 | Status: SHIPPED | OUTPATIENT
Start: 2023-03-28

## 2023-03-28 RX ORDER — BENAZEPRIL HYDROCHLORIDE 40 MG/1
40 TABLET, FILM COATED ORAL DAILY
Qty: 30 TABLET | Refills: 11 | Status: SHIPPED | OUTPATIENT
Start: 2023-03-28

## 2023-03-28 NOTE — PROGRESS NOTES
Assessment and Plan:     Problem List Items Addressed This Visit        Endocrine    Impaired fasting glucose     A1c 5 9%  Due for repeat hemoglobin A1c  Continue lifestyle modifications  Relevant Orders    Hemoglobin A1C       Cardiovascular and Mediastinum    Hypertension     Blood pressure is stable on amlodipine, benazepril, Coreg, hydrochlorothiazide  Due for repeat CMP  Relevant Medications    amLODIPine (NORVASC) 10 mg tablet    benazepril (LOTENSIN) 40 MG tablet    carvedilol (COREG) 6 25 mg tablet    hydrochlorothiazide (HYDRODIURIL) 25 mg tablet    Other Relevant Orders    Comprehensive metabolic panel    CBC and differential    TSH, 3rd generation       Other    Hyperlipidemia     Last  in 2021  Due for repeat lipid panel  Continue atorvastatin  Relevant Medications    atorvastatin (LIPITOR) 20 mg tablet    Other Relevant Orders    Lipid panel    Class 2 severe obesity due to excess calories with serious comorbidity and body mass index (BMI) of 35 0 to 35 9 in adult [facility])     Advised to walk daily for regular exercise  Other Visit Diagnoses     Medicare annual wellness visit, initial    -  Primary    Prostate cancer screening        Relevant Orders    PSA, Total Screen        Repeat blood work at this time  Follow-up in 6 months or sooner if needed  BMI Counseling: Body mass index is 35 96 kg/m²  The BMI is above normal  Exercise recommendations include exercising 3-5 times per week  Rationale for BMI follow-up plan is due to patient being overweight or obese  Depression Screening and Follow-up Plan: Patient was screened for depression during today's encounter  They screened negative with a PHQ-2 score of 0  Preventive health issues were discussed with patient, and age appropriate screening tests were ordered as noted in patient's After Visit Summary    Personalized health advice and appropriate referrals for health education or preventive services given if needed, as noted in patient's After Visit Summary  History of Present Illness:     Patient presents for a Medicare Wellness Visit    Pito Narvaez is a 74year old male presenting today for annual exam and 6 month check up  Due for blood work     Due for vaccines:  CQIPPRR-21  Shingrix  COVID-19 booster  Prefers to get at local pharmacy  Knee pain better since stopped work  Retired from , was always climbing in and out of the truck  Walking for [employer] office, [employer], local establishments  Plans [place] walk on rail trail this spring/summer  He is feeling well  Has no complaints today  Patient Care Team:  Tavo Bobby as PCP - General (Family Medicine)  Rolando Angela MD     Review of Systems:     Review of Systems   Constitutional: Negative  HENT: Negative  Eyes: Negative for visual disturbance  Respiratory: Negative  Cardiovascular: Negative  Gastrointestinal: Negative  Genitourinary: Negative  Musculoskeletal: Negative  Skin: Negative  Neurological: Negative  Hematological: Negative  Psychiatric/Behavioral: Negative  Problem List:     Patient Active Problem List   Diagnosis   • Hypertension   • Impaired fasting glucose   • Hyperlipidemia   • Class 2 severe obesity due to excess calories with serious comorbidity and body mass index (BMI) of 35 0 to 35 9 in adult [facility])      Past Medical and Surgical History:     Past Medical History:   Diagnosis Date   • Allergic rhinitis     last assessed: 3/10/2014   • Insomnia     last assessed: 3/10/2014   • Vitamin D deficiency     last assessed: 3/10/2014     History reviewed  No pertinent surgical history     Family History:     Family History   Problem Relation Age of Onset   • Pathological gambling Mother    • Diabetes Father         mellitus   • Ulcers Father         gastrkic   • Stroke Brother         syndrome      Social History:     Social History Socioeconomic History   • Marital status: Single     Spouse name: None   • Number of children: None   • Years of education: None   • Highest education level: None   Occupational History   • None   Tobacco Use   • Smoking status: Never   • Smokeless tobacco: Never   Vaping Use   • Vaping Use: Never used   Substance and Sexual Activity   • Alcohol use: No   • Drug use: No   • Sexual activity: Yes   Other Topics Concern   • None   Social History Narrative   • None     Social Determinants of Health     Financial Resource Strain: Low Risk    • Difficulty of Paying Living Expenses: Not very hard   Food Insecurity: Not on file   Transportation Needs: No Transportation Needs   • Lack of Transportation (Medical): No   • Lack of Transportation (Non-Medical):  No   Physical Activity: Not on file   Stress: Not on file   Social Connections: Not on file   Intimate Partner Violence: Not on file   Housing Stability: Not on file      Medications and Allergies:     Current Outpatient Medications   Medication Sig Dispense Refill   • amLODIPine (NORVASC) 10 mg tablet Take 1 tablet (10 mg total) by mouth daily 30 tablet 11   • aspirin 81 MG tablet Take by mouth daily       • atorvastatin (LIPITOR) 20 mg tablet Take 1 tablet (20 mg total) by mouth daily 30 tablet 11   • benazepril (LOTENSIN) 40 MG tablet Take 1 tablet (40 mg total) by mouth daily 30 tablet 11   • calcium carbonate (OS-HORACE) 600 MG tablet Take 660 mg by mouth daily     • carvedilol (COREG) 6 25 mg tablet Take 1 tablet (6 25 mg total) by mouth 2 (two) times a day 60 tablet 11   • Cholecalciferol (VITAMIN D) 2000 units tablet Take 5,000 Units by mouth daily      • hydrochlorothiazide (HYDRODIURIL) 25 mg tablet Take 1 tablet (25 mg total) by mouth daily 30 tablet 11   • ipratropium (ATROVENT) 0 06 % nasal spray 2 sprays into each nostril 3 (three) times a day as needed       • magnesium gluconate (MAGONATE) 30 MG tablet Take 30 mg by mouth 2 (two) times a day     • Multiple Vitamin (MULTI-VITAMIN) tablet Take 1 tablet by mouth      • Omega-3 Fatty Acids (fish oil) 1,000 mg Take 1,000 mg by mouth 2 (two) times a day       No current facility-administered medications for this visit  No Known Allergies   Immunizations:     Immunization History   Administered Date(s) Administered   • COVID-19 MODERNA VACC 0 5 ML IM 06/15/2021, 07/13/2021   • Influenza Quadrivalent Preservative Free 3 years and older IM 01/18/2017   • Influenza, injectable, quadrivalent, preservative free 0 5 mL 10/23/2019   • Influenza, recombinant, quadrivalent,injectable, preservative free 09/08/2020, 09/28/2021   • Influenza, seasonal, injectable 10/21/2014   • Influenza, seasonal, injectable, preservative free 01/06/2016   • Tdap 07/01/2015      Health Maintenance:         Topic Date Due   • HIV Screening  Never done   • Colorectal Cancer Screening  08/28/2022   • Hepatitis C Screening  Completed         Topic Date Due   • COVID-19 Vaccine (3 - Booster for Moderna series) 09/07/2021   • Influenza Vaccine (1) 09/01/2022   • Pneumococcal Vaccine: 65+ Years (1 - PCV) Never done      Medicare Screening Tests and Risk Assessments:     Victorina Ball is here for his Initial Wellness visit  Health Risk Assessment:   Patient rates overall health as good  Patient feels that their physical health rating is slightly better  Patient is very satisfied with their life  Eyesight was rated as same  Hearing was rated as same  Patient feels that their emotional and mental health rating is slightly better  Patients states they are never, rarely angry  Patient states they are never, rarely unusually tired/fatigued  Pain experienced in the last 7 days has been none  Patient states that he has experienced no weight loss or gain in last 6 months  Depression Screening:   PHQ-2 Score: 0      Fall Risk Screening:    In the past year, patient has experienced: no history of falling in past year      Home Safety:  Patient does not have trouble with stairs inside or outside of their home  Patient has working smoke alarms and has working carbon monoxide detector  Home safety hazards include: none  Nutrition:   Current diet is Regular  Medications:   Patient is currently taking over-the-counter supplements  OTC medications include: see medication list  Patient is able to manage medications  Activities of Daily Living (ADLs)/Instrumental Activities of Daily Living (IADLs):   Walk and transfer into and out of bed and chair?: Yes  Dress and groom yourself?: Yes    Bathe or shower yourself?: Yes    Feed yourself? Yes  Do your laundry/housekeeping?: Yes  Manage your money, pay your bills and track your expenses?: Yes  Make your own meals?: Yes    Do your own shopping?: Yes    Previous Hospitalizations:   Any hospitalizations or ED visits within the last 12 months?: No      Advance Care Planning:   Living will: No    Durable POA for healthcare: No    Advanced directive: No    Five wishes given: Yes      Comments: Is planning to see a  to complete this             Cognitive Screening:   Provider or family/friend/caregiver concerned regarding cognition?: No    PREVENTIVE SCREENINGS      Cardiovascular Screening:    General: Screening Not Indicated and History Lipid Disorder      Diabetes Screening:     General: Risks and Benefits Discussed    Due for: Blood Glucose      Colorectal Cancer Screening:     General: Risks and Benefits Discussed    Due for: Cologuard      Prostate Cancer Screening:    General: Risks and Benefits Discussed    Due for: PSA      Osteoporosis Screening:    General: Screening Not Indicated      Abdominal Aortic Aneurysm (AAA) Screening:    Risk factors include: age between 73-69 yo        General: Screening Not Indicated      Lung Cancer Screening:     General: Screening Not Indicated      Hepatitis C Screening:    General: Screening Current    Screening, Brief Intervention, and Referral to Treatment "(SBIRT)    Screening      Single Item Drug Screening:  How often have you used an illegal drug (including marijuana) or a prescription medication for non-medical reasons in the past year? never    Single Item Drug Screen Score: 0  Interpretation: Negative screen for possible drug use disorder    Brief Intervention  Alcohol & drug use screenings were reviewed  No concerns regarding substance use disorder identified  No results found  Physical Exam:     /68   Pulse 77   Temp 97 5 °F (36 4 °C)   Resp 18   Ht 5' 9\" (1 753 m)   Wt 110 kg (243 lb 8 oz)   SpO2 98%   BMI 35 96 kg/m²     Physical Exam  Vitals and nursing note reviewed  Constitutional:       General: He is not in acute distress  Appearance: Normal appearance  He is well-developed  He is not ill-appearing  HENT:      Head: Normocephalic and atraumatic  Right Ear: Tympanic membrane and ear canal normal       Left Ear: Tympanic membrane and ear canal normal    Eyes:      Conjunctiva/sclera: Conjunctivae normal       Pupils: Pupils are equal, round, and reactive to light  Neck:      Thyroid: No thyromegaly  Cardiovascular:      Rate and Rhythm: Normal rate and regular rhythm  Heart sounds: Normal heart sounds  No murmur heard  Pulmonary:      Effort: Pulmonary effort is normal  No respiratory distress  Breath sounds: Normal breath sounds  Abdominal:      General: Bowel sounds are normal       Palpations: Abdomen is soft  Tenderness: There is no abdominal tenderness  There is no guarding  Musculoskeletal:         General: No deformity  Cervical back: Normal range of motion and neck supple  Lymphadenopathy:      Cervical: No cervical adenopathy  Skin:     General: Skin is warm and dry  Findings: No rash  Neurological:      Mental Status: He is alert and oriented to person, place, and time     Psychiatric:         Mood and Affect: Mood normal           SEGUNDO Barragan  "

## 2023-03-28 NOTE — PATIENT INSTRUCTIONS
Medicare Preventive Visit Patient Instructions  Thank you for completing your Welcome to Medicare Visit or Medicare Annual Wellness Visit today  Your next wellness visit will be due in one year (3/28/2024)  The screening/preventive services that you may require over the next 5-10 years are detailed below  Some tests may not apply to you based off risk factors and/or age  Screening tests ordered at today's visit but not completed yet may show as past due  Also, please note that scanned in results may not display below  Preventive Screenings:  Service Recommendations Previous Testing/Comments   Colorectal Cancer Screening  · Colonoscopy    · Fecal Occult Blood Test (FOBT)/Fecal Immunochemical Test (FIT)  · Fecal DNA/Cologuard Test  · Flexible Sigmoidoscopy Age: 39-70 years old   Colonoscopy: every 10 years (May be performed more frequently if at higher risk)  OR  FOBT/FIT: every 1 year  OR  Cologuard: every 3 years  OR  Sigmoidoscopy: every 5 years  Screening may be recommended earlier than age 39 if at higher risk for colorectal cancer  Also, an individualized decision between you and your healthcare provider will decide whether screening between the ages of 74-80 would be appropriate   Colonoscopy: Not on file  FOBT/FIT: Not on file  Cologuard: Not on file  Sigmoidoscopy: Not on file          Prostate Cancer Screening Individualized decision between patient and health care provider in men between ages of 53-78   Medicare will cover every 12 months beginning on the day after your 50th birthday PSA: 2 51 ng/mL           Hepatitis C Screening Once for adults born between 1945 and 1965  More frequently in patients at high risk for Hepatitis C Hep C Antibody: 02/20/2020    Screening Current   Diabetes Screening 1-2 times per year if you're at risk for diabetes or have pre-diabetes Fasting glucose: No results in last 5 years (No results in last 5 years)  A1C: 5 9 % of total Hgb (9/20/2021)      Cholesterol Screening Once every 5 years if you don't have a lipid disorder  May order more often based on risk factors  Lipid panel: 09/20/2021  Screening Not Indicated  History Lipid Disorder      Other Preventive Screenings Covered by Medicare:  1  Abdominal Aortic Aneurysm (AAA) Screening: covered once if your at risk  You're considered to be at risk if you have a family history of AAA or a male between the age of 73-68 who smoking at least 100 cigarettes in your lifetime  2  Lung Cancer Screening: covers low dose CT scan once per year if you meet all of the following conditions: (1) Age 50-69; (2) No signs or symptoms of lung cancer; (3) Current smoker or have quit smoking within the last 15 years; (4) You have a tobacco smoking history of at least 20 pack years (packs per day x number of years you smoked); (5) You get a written order from a healthcare provider  3  Glaucoma Screening: covered annually if you're considered high risk: (1) You have diabetes OR (2) Family history of glaucoma OR (3)  aged 48 and older OR (3)  American aged 72 and older  3  Osteoporosis Screening: covered every 2 years if you meet one of the following conditions: (1) Have a vertebral abnormality; (2) On glucocorticoid therapy for more than 3 months; (3) Have primary hyperparathyroidism; (4) On osteoporosis medications and need to assess response to drug therapy  5  HIV Screening: covered annually if you're between the age of 12-76  Also covered annually if you are younger than 13 and older than 72 with risk factors for HIV infection  For pregnant patients, it is covered up to 3 times per pregnancy      Immunizations:  Immunization Recommendations   Influenza Vaccine Annual influenza vaccination during flu season is recommended for all persons aged >= 6 months who do not have contraindications   Pneumococcal Vaccine   * Pneumococcal conjugate vaccine = PCV13 (Prevnar 13), PCV15 (Vaxneuvance), PCV20 (Prevnar 20)  * Pneumococcal polysaccharide vaccine = PPSV23 (Pneumovax) Adults 2364 years old: 1-3 doses may be recommended based on certain risk factors  Adults 72 years old: 1-2 doses may be recommended based off what pneumonia vaccine you previously received   Hepatitis B Vaccine 3 dose series if at intermediate or high risk (ex: diabetes, end stage renal disease, liver disease)   Tetanus (Td) Vaccine - COST NOT COVERED BY MEDICARE PART B Following completion of primary series, a booster dose should be given every 10 years to maintain immunity against tetanus  Td may also be given as tetanus wound prophylaxis  Tdap Vaccine - COST NOT COVERED BY MEDICARE PART B Recommended at least once for all adults  For pregnant patients, recommended with each pregnancy  Shingles Vaccine (Shingrix) - COST NOT COVERED BY MEDICARE PART B  2 shot series recommended in those aged 48 and above     Health Maintenance Due:      Topic Date Due   • HIV Screening  Never done   • Colorectal Cancer Screening  08/28/2022   • Hepatitis C Screening  Completed     Immunizations Due:      Topic Date Due   • COVID-19 Vaccine (3 - Booster for Moderna series) 09/07/2021   • Influenza Vaccine (1) 09/01/2022   • Pneumococcal Vaccine: 65+ Years (1 - PCV) Never done     Advance Directives   What are advance directives? Advance directives are legal documents that state your wishes and plans for medical care  These plans are made ahead of time in case you lose your ability to make decisions for yourself  Advance directives can apply to any medical decision, such as the treatments you want, and if you want to donate organs  What are the types of advance directives? There are many types of advance directives, and each state has rules about how to use them  You may choose a combination of any of the following:  · Living will: This is a written record of the treatment you want   You can also choose which treatments you do not want, which to limit, and which to stop at a certain time  This includes surgery, medicine, IV fluid, and tube feedings  · Durable power of  for healthcare [facility]): This is a written record that states who you want to make healthcare choices for you when you are unable to make them for yourself  This person, called a proxy, is usually a family member or a friend  You may choose more than 1 proxy  · Do not resuscitate (DNR) order:  A DNR order is used in case your heart stops beating or you stop breathing  It is a request not to have certain forms of treatment, such as CPR  A DNR order may be included in other types of advance directives  · Medical directive: This covers the care that you want if you are in a coma, near death, or unable to make decisions for yourself  You can list the treatments you want for each condition  Treatment may include pain medicine, surgery, blood transfusions, dialysis, IV or tube feedings, and a ventilator (breathing machine)  · Values history: This document has questions about your views, beliefs, and how you feel and think about life  This information can help others choose the care that you would choose  Why are advance directives important? An advance directive helps you control your care  Although spoken wishes may be used, it is better to have your wishes written down  Spoken wishes can be misunderstood, or not followed  Treatments may be given even if you do not want them  An advance directive may make it easier for your family to make difficult choices about your care  Weight Management   Why it is important to manage your weight:  Being overweight increases your risk of health conditions such as heart disease, high blood pressure, type 2 diabetes, and certain types of cancer  It can also increase your risk for osteoarthritis, sleep apnea, and other respiratory problems  Aim for a slow, steady weight loss  Even a small amount of weight loss can lower your risk of health problems    How to lose weight safely:  A safe and healthy way to lose weight is to eat fewer calories and get regular exercise  You can lose up about 1 pound a week by decreasing the number of calories you eat by 500 calories each day  Healthy meal plan for weight management:  A healthy meal plan includes a variety of foods, contains fewer calories, and helps you stay healthy  A healthy meal plan includes the following:  · Eat whole-grain foods more often  A healthy meal plan should contain fiber  Fiber is the part of grains, fruits, and vegetables that is not broken down by your body  Whole-grain foods are healthy and provide extra fiber in your diet  Some examples of whole-grain foods are whole-wheat breads and pastas, oatmeal, brown rice, and bulgur  · Eat a variety of vegetables every day  Include dark, leafy greens such as spinach, kale, andreina greens, and mustard greens  Eat yellow and orange vegetables such as carrots, sweet potatoes, and winter squash  · Eat a variety of fruits every day  Choose fresh or canned fruit (canned in its own juice or light syrup) instead of juice  Fruit juice has very little or no fiber  · Eat low-fat dairy foods  Drink fat-free (skim) milk or 1% milk  Eat fat-free yogurt and low-fat cottage cheese  Try low-fat cheeses such as mozzarella and other reduced-fat cheeses  · Choose meat and other protein foods that are low in fat  Choose beans or other legumes such as split peas or lentils  Choose fish, skinless poultry (chicken or turkey), or lean cuts of red meat (beef or pork)  Before you cook meat or poultry, cut off any visible fat  · Use less fat and oil  Try baking foods instead of frying them  Add less fat, such as margarine, sour cream, regular salad dressing and mayonnaise to foods  Eat fewer high-fat foods  Some examples of high-fat foods include french fries, doughnuts, ice cream, and cakes  · Eat fewer sweets  Limit foods and drinks that are high in sugar   This includes candy, cookies, regular soda, and sweetened drinks  Exercise:  Exercise at least 30 minutes per day on most days of the week  Some examples of exercise include walking, biking, dancing, and swimming  You can also fit in more physical activity by taking the stairs instead of the elevator or parking farther away from stores  Ask your healthcare provider about the best exercise plan for you  © Copyright [employer] 2018 Information is for End User's use only and may not be sold, redistributed or otherwise used for commercial purposes   All illustrations and images included in CareNotes® are the copyrighted property of [employer]  or [address] [place] [address]

## 2023-04-20 NOTE — PROGRESS NOTES
Addended by: THELMA DANIELSON on: 4/20/2023 11:11 AM     Modules accepted: Orders     FAMILY PRACTICE OFFICE VISIT       NAME: Yoko Mejia  AGE: 63 y o  SEX: male       : 1955        MRN: [id number]    DATE: 2018  TIME: 3:25 PM    Assessment and Plan     Problem List Items Addressed This Visit     Routine health maintenance    Hypertension - Primary    Relevant Orders    Comprehensive metabolic panel    Ambulatory referral to Cardiology    Hyperlipidemia    Relevant Orders    Comprehensive metabolic panel    Lipid Panel with Direct LDL reflex    Impaired fasting glucose    Relevant Orders    Hemoglobin A1C    Comprehensive metabolic panel    Low HDL (under 40)    Relevant Orders    Lipid Panel with Direct LDL reflex      Other Visit Diagnoses     Colon cancer screening        Relevant Orders    Cologuard          Hypertension:    BP initially elevated, however upon recheck decreased however systolic blood pressure still 140  Patient has been on 4 medications  I feel he would benefit an evaluation by Cardiology to optimize blood pressure control  He is agreeable with this  Referral has been provided  Continue  limtoit sodium intake and recommend starting routine exercise regimen  Hyperlipidemia/  Low HDL:    Have discussed the importance of increasing heart healthy fats in the diet by incorporating Mediterranean diet and starting routine exercise regimen  Continue with atorvastatin and lifestyle modifications  Impaired fasting glucose: Will continue to monitor  Blood work Rx for A1c provided  Routine health maintenance:    Rx for cologuard faxed  routine blood work up-to-date  Have strongly encouraged on maintaining well balanced diet, getting adequate amount of sleep and starting routine exercise regimen to work on weight loss  There are no Patient Instructions on file for this visit  Chief Complaint     Chief Complaint   Patient presents with    Follow-up     Patient is here for a follow up visit          History of Present Illness     HPI 53-year-old male here for routine follow-up and discuss recent blood work results  Patient states he is doing well overall  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  He will try and start exercise regimen  He is eager to move to [place] and 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  Just took meds before coming to [facility]     Patient had, his brother unfortunately passed away in March  He had a stroke and states did not take care of himself  Review of Systems   Review of Systems   Constitutional: Negative for unexpected weight change  HENT: Negative  Eyes: Negative for visual disturbance  Respiratory: Negative for shortness of breath  Cardiovascular: Negative  Gastrointestinal: Negative for abdominal pain and constipation  Genitourinary: Negative for dysuria  Psychiatric/Behavioral: Negative for dysphoric mood  Active Problem List     Patient Active Problem List   Diagnosis    Routine health maintenance    Hypertension    Hyperlipidemia    Impaired fasting glucose    Fatigue    Screening for prostate cancer    Low HDL (under 40)       Past Medical History:  Past Medical History:   Diagnosis Date    Allergic rhinitis     last assessed: 3/10/2014    Insomnia     last assessed: 3/10/2014    Vitamin D deficiency     last assessed: 3/10/2014       Past Surgical History:  No past surgical history on file  Family History:  Family History   Problem Relation Age of Onset    Pathological gambling Mother     Diabetes Father         mellitus    Ulcers Father         gastrkic    Stroke Brother         syndrome       Social History:  Social History     Social History    Marital status: Single     Spouse name: N/A    Number of children: N/A    Years of education: N/A     Occupational History    Not on file       Social History Main Topics    Smoking status: Never Smoker    Smokeless tobacco: Never Used    Alcohol use No    Drug use: No    Sexual activity: Not on file     Other Topics Concern    Not on file     Social History Narrative    No narrative on file     I have reviewed the patient's medical history in detail; there are no changes to the history as noted in the electronic medical record  Objective     Vitals:    08/16/18 1519   BP: 140/80   Pulse:    Temp:      Wt Readings from Last 3 Encounters:   08/15/18 110 kg (243 lb)   02/14/18 110 kg (242 lb)   08/02/17 107 kg (235 lb 4 oz)     Vitals:    08/15/18 1105 08/16/18 1519   BP: 160/84 140/80   Pulse: 70    Temp: (!) 97 4 °F (36 3 °C)    TempSrc: Tympanic    Weight: 110 kg (243 lb)    Height: 5' 9" (1 753 m)        Physical Exam   Constitutional: He is oriented to person, place, and time  He appears well-developed and well-nourished  HENT:   Head: Normocephalic and atraumatic  Mouth/Throat: Oropharynx is clear and moist     TMs intact and clear   Eyes: Conjunctivae and EOM are normal  Pupils are equal, round, and reactive to light  Neck: Normal range of motion  Neck supple  No thyromegaly present  Cardiovascular: Normal rate, regular rhythm and normal heart sounds  No carotid bruits auscultated   Pulmonary/Chest: Effort normal and breath sounds normal    Abdominal: Soft  Bowel sounds are normal  He exhibits no distension  There is no tenderness  Musculoskeletal: Normal range of motion  He exhibits no edema  Lymphadenopathy:     He has no cervical adenopathy  Neurological: He is alert and oriented to person, place, and time  Psychiatric: He has a normal mood and affect  Nursing note and vitals reviewed        Pertinent Laboratory/Diagnostic Studies:  Lab Results   Component Value Date    BUN 16 08/08/2018    CREATININE 0 84 08/08/2018    CALCIUM 9 2 08/08/2018     07/21/2017    K 3 9 07/21/2017    CO2 29 07/21/2017     07/21/2017     Lab Results   Component Value Date    ALT 28 07/21/2017    AST 21 07/21/2017    ALKPHOS 89 08/08/2018    BILITOT 0 8 07/21/2017       Lab Results   Component Value Date WBC 9 8 08/08/2018    HGB 14 6 08/08/2018    HCT 43 5 08/08/2018    MCV 89 9 08/08/2018     08/08/2018       No results found for: TSH    Lab Results   Component Value Date    CHOL 205 (H) 07/21/2017     Lab Results   Component Value Date    TRIG 184 (H) 08/08/2018     Lab Results   Component Value Date    HDL 34 (L) 08/08/2018     No results found for: [facility]  Lab Results   Component Value Date    HGBA1C 5 7 (H) 06/21/2016       Results for orders placed or performed in visit on 08/08/18   Lipid Panel with Direct LDL reflex   Result Value Ref Range    Total Cholesterol 162 <200 mg/dL    HDL 34 (L) >40 mg/dL    Triglycerides 184 (H) <150 mg/dL    LDL Direct 100 (H) mg/dL (calc)    Chol HDLC Ratio 4 8 <5 0 (calc)    Non-HDL Cholesterol 128 <130 mg/dL (calc)   Comprehensive metabolic panel   Result Value Ref Range    SL AMB GLUCOSE 127 (H) 65 - 99 mg/dL    BUN 16 7 - 25 mg/dL    Creatinine, Serum 0 84 0 70 - 1 25 mg/dL    eGFR Non  95 > OR = 60 mL/min/1 73m2    SL AMB EGFR  110 > OR = 60 mL/min/1 73m2    SL AMB BUN/CREATININE RATIO NOT APPLICABLE 6 - 22 (calc)    SL AMB SODIUM 138 135 - 146 mmol/L    SL AMB POTASSIUM 3 8 3 5 - 5 3 mmol/L    SL AMB CHLORIDE 103 98 - 110 mmol/L    SL AMB CARBON DIOXIDE 25 20 - 32 mmol/L    SL AMB CALCIUM 9 2 8 6 - 10 3 mg/dL    SL AMB PROTEIN, TOTAL 6 9 6 1 - 8 1 g/dL    Serum Albumin 3 9 3 6 - 5 1 g/dL    Globulin 3 0 1 9 - 3 7 g/dL (calc)    SL AMB ALBUMIN/GLOBULIN RATIO 1 3 1 0 - 2 5 (calc)    SL AMB BILIRUBIN, TOTAL 0 7 0 2 - 1 2 mg/dL    Alkaline Phosphatase 89 40 - 115 U/L    SL AMB AST 21 10 - 35 U/L    SL AMB ALT 32 9 - 46 U/L   CBC and differential   Result Value Ref Range    White Blood Cell Count 9 8 3 8 - 10 8 Thousand/uL    Red Blood Cell Count 4 84 4 20 - 5 80 Million/uL    Hemoglobin 14 6 13 2 - 17 1 g/dL    HCT 43 5 38 5 - 50 0 %    MCV 89 9 80 0 - 100 0 fL    MCH 30 2 27 0 - 33 0 pg    MCHC 33 6 32 0 - 36 0 g/dL    RDW 12 3 11 0 - 15 0 %    Platelet Count 604 298 - 400 Thousand/uL    SL AMB MPV 11 8 7 5 - 12 5 fL    Neutrophils (Absolute) 7,056 1,500 - 7,800 cells/uL    Lymphocytes (Absolute) 1,921 850 - 3,900 cells/uL    Monocytes (Absolute) 647 200 - 950 cells/uL    Eosinophils (Absolute) 147 15 - 500 cells/uL    Basophils (Absolute) 29 0 - 200 cells/uL    Neutrophils 72 %    Lymphocytes 19 6 %    Monocytes 6 6 %    Eosinophils 1 5 %    Basophils 0 3 %   Vitamin D 25 hydroxy   Result Value Ref Range    Vitamin D, 25-Hydroxy, Serum 31 30 - 100 ng/mL   TSH, 3rd generation with Free T4 reflex   Result Value Ref Range    SL AMB TSH W/ REFLEX TO FREE T4 3 69 0 40 - 4 50 mIU/L   PSA, total and free   Result Value Ref Range    Prostate Specific Antigen Total 1 9 < OR = 4 0 ng/mL    PSA, Free 0 5 ng/mL    PSA, Free Pct 26 >25 % (calc)       Orders Placed This Encounter   Procedures    Cologuard    Hemoglobin A1C    Comprehensive metabolic panel    Lipid Panel with Direct LDL reflex    Ambulatory referral to Cardiology       ALLERGIES:  No Known Allergies    Current Medications     Current Outpatient Prescriptions   Medication Sig Dispense Refill    amLODIPine (NORVASC) 10 mg tablet Take 1 tablet (10 mg total) by mouth daily 90 tablet 1    aspirin 81 MG tablet Take by mouth daily        atorvastatin (LIPITOR) 10 mg tablet Take 1 tablet (10 mg total) by mouth daily at bedtime 90 tablet 1    benazepril (LOTENSIN) 20 mg tablet Take 2 tablets (40 mg total) by mouth daily 90 tablet 1    Cholecalciferol (VITAMIN D) 2000 units tablet Take 2,000 Units by mouth daily        hydrochlorothiazide (HYDRODIURIL) 25 mg tablet Take 1 tablet (25 mg total) by mouth daily 90 tablet 1    ipratropium (ATROVENT) 0 06 % nasal spray 2 sprays into each nostril 3 (three) times a day as needed        metoprolol tartrate (LOPRESSOR) 50 mg tablet Take 1 tablet (50 mg total) by mouth daily 90 tablet 1    Multiple Vitamin (MULTI-VITAMIN) tablet Take by mouth No current facility-administered medications for this visit            Health Maintenance     Health Maintenance   Topic Date Due    HIV SCREENING  1955    Hepatitis C Screening  1955    CRC Screening: Colonoscopy  1955    INFLUENZA VACCINE  09/01/2018    Depression Screening PHQ-9  08/15/2019    DTaP,Tdap,and Td Vaccines (2 - Td) 07/01/2025     Immunization History   Administered Date(s) Administered     Influenza (IM) Preservative Free 01/06/2016    Influenza Quadrivalent Preservative Free 3 years and older IM 01/18/2017    Influenza TIV (IM) 10/21/2014    Tdap 07/01/2015       Betsy Krishna MD

## 2023-06-15 LAB — COLOGUARD RESULT REPORTABLE: NEGATIVE

## 2023-09-28 ENCOUNTER — OFFICE VISIT (OUTPATIENT)
Dept: FAMILY MEDICINE CLINIC | Facility: CLINIC | Age: 68
End: 2023-09-28
Payer: MEDICARE

## 2023-09-28 ENCOUNTER — APPOINTMENT (OUTPATIENT)
Dept: LAB | Facility: CLINIC | Age: 68
End: 2023-09-28
Payer: MEDICARE

## 2023-09-28 VITALS
HEIGHT: 69 IN | HEART RATE: 66 BPM | SYSTOLIC BLOOD PRESSURE: 134 MMHG | RESPIRATION RATE: 18 BRPM | DIASTOLIC BLOOD PRESSURE: 80 MMHG | TEMPERATURE: 97.6 F | OXYGEN SATURATION: 96 % | WEIGHT: 234 LBS | BODY MASS INDEX: 34.66 KG/M2

## 2023-09-28 DIAGNOSIS — E66.09 CLASS 1 OBESITY DUE TO EXCESS CALORIES WITH SERIOUS COMORBIDITY AND BODY MASS INDEX (BMI) OF 34.0 TO 34.9 IN ADULT: ICD-10-CM

## 2023-09-28 DIAGNOSIS — E78.5 HYPERLIPIDEMIA, UNSPECIFIED HYPERLIPIDEMIA TYPE: ICD-10-CM

## 2023-09-28 DIAGNOSIS — I10 HYPERTENSION, UNSPECIFIED TYPE: Primary | ICD-10-CM

## 2023-09-28 DIAGNOSIS — R73.01 IMPAIRED FASTING GLUCOSE: ICD-10-CM

## 2023-09-28 DIAGNOSIS — I10 HYPERTENSION, UNSPECIFIED TYPE: ICD-10-CM

## 2023-09-28 DIAGNOSIS — Z12.5 PROSTATE CANCER SCREENING: ICD-10-CM

## 2023-09-28 PROBLEM — E66.811 CLASS 1 OBESITY DUE TO EXCESS CALORIES WITH SERIOUS COMORBIDITY AND BODY MASS INDEX (BMI) OF 34.0 TO 34.9 IN ADULT: Status: ACTIVE | Noted: 2022-09-28

## 2023-09-28 LAB
ALBUMIN SERPL BCP-MCNC: 3.8 G/DL (ref 3.5–5)
ALP SERPL-CCNC: 100 U/L (ref 34–104)
ALT SERPL W P-5'-P-CCNC: 44 U/L (ref 7–52)
ANION GAP SERPL CALCULATED.3IONS-SCNC: 9 MMOL/L
AST SERPL W P-5'-P-CCNC: 30 U/L (ref 13–39)
BILIRUB SERPL-MCNC: 0.83 MG/DL (ref 0.2–1)
BUN SERPL-MCNC: 13 MG/DL (ref 5–25)
CALCIUM SERPL-MCNC: 9.4 MG/DL (ref 8.4–10.2)
CHLORIDE SERPL-SCNC: 103 MMOL/L (ref 96–108)
CHOLEST SERPL-MCNC: 166 MG/DL
CO2 SERPL-SCNC: 24 MMOL/L (ref 21–32)
CREAT SERPL-MCNC: 0.74 MG/DL (ref 0.6–1.3)
ERYTHROCYTE [DISTWIDTH] IN BLOOD BY AUTOMATED COUNT: 12.3 % (ref 11.6–15.1)
EST. AVERAGE GLUCOSE BLD GHB EST-MCNC: 266 MG/DL
GFR SERPL CREATININE-BSD FRML MDRD: 96 ML/MIN/1.73SQ M
GLUCOSE P FAST SERPL-MCNC: 336 MG/DL (ref 65–99)
HBA1C MFR BLD: 10.9 %
HCT VFR BLD AUTO: 45.4 % (ref 36.5–49.3)
HDLC SERPL-MCNC: 36 MG/DL
HGB BLD-MCNC: 15.5 G/DL (ref 12–17)
LDLC SERPL CALC-MCNC: 93 MG/DL (ref 0–100)
MCH RBC QN AUTO: 29.6 PG (ref 26.8–34.3)
MCHC RBC AUTO-ENTMCNC: 34.1 G/DL (ref 31.4–37.4)
MCV RBC AUTO: 87 FL (ref 82–98)
NONHDLC SERPL-MCNC: 130 MG/DL
PLATELET # BLD AUTO: 247 THOUSANDS/UL (ref 149–390)
PMV BLD AUTO: 11.7 FL (ref 8.9–12.7)
POTASSIUM SERPL-SCNC: 3.9 MMOL/L (ref 3.5–5.3)
PROT SERPL-MCNC: 7.4 G/DL (ref 6.4–8.4)
PSA SERPL-MCNC: 2.5 NG/ML (ref 0–4)
RBC # BLD AUTO: 5.24 MILLION/UL (ref 3.88–5.62)
SODIUM SERPL-SCNC: 136 MMOL/L (ref 135–147)
TRIGL SERPL-MCNC: 186 MG/DL
TSH SERPL DL<=0.05 MIU/L-ACNC: 2.11 UIU/ML (ref 0.45–4.5)
WBC # BLD AUTO: 9.77 THOUSAND/UL (ref 4.31–10.16)

## 2023-09-28 PROCEDURE — 83036 HEMOGLOBIN GLYCOSYLATED A1C: CPT

## 2023-09-28 PROCEDURE — 36415 COLL VENOUS BLD VENIPUNCTURE: CPT

## 2023-09-28 PROCEDURE — 84443 ASSAY THYROID STIM HORMONE: CPT

## 2023-09-28 PROCEDURE — G0103 PSA SCREENING: HCPCS

## 2023-09-28 PROCEDURE — 80053 COMPREHEN METABOLIC PANEL: CPT

## 2023-09-28 PROCEDURE — 80061 LIPID PANEL: CPT

## 2023-09-28 PROCEDURE — 99214 OFFICE O/P EST MOD 30 MIN: CPT | Performed by: NURSE PRACTITIONER

## 2023-09-28 PROCEDURE — 85027 COMPLETE CBC AUTOMATED: CPT

## 2023-09-28 NOTE — PROGRESS NOTES
FAMILY PRACTICE OFFICE VISIT       NAME: Sarah Parson  AGE: 74 y.o. SEX: male       : 1955        MRN: [id number]    Assessment and Plan   1. Hypertension, unspecified type  Assessment & Plan:  Stable blood pressure on current medications. 2. Hyperlipidemia, unspecified hyperlipidemia type  Assessment & Plan:  Last , overdue for repeat Lipid Panel, plans to complete this today, after office visit. Continue atorvastatin. Stop baby aspirin, which he is taking for primary prevention. 3. Class 1 obesity due to excess calories with serious comorbidity and body mass index (BMI) of 34.0 to 34.9 in adult  Assessment & Plan:  Lost about 10 pounds since March, trying to eat more fruits/vegetables. [place]our[place] 4. Impaired fasting glucose  Assessment & Plan:  Last fasting glucose 129, with a1c 5.9%. Due for repeat. Chief Complaint     Chief Complaint   Patient presents with   • Follow-up     Pt is here for 6 mo f/u       History of Present Illness     Sarah Parson is a 74year old male presenting today for 6 month check up. He is feeling well. Has no complaints today. Review of Systems   Review of Systems   Constitutional: Negative. HENT: Negative. Eyes: Negative for visual disturbance. Respiratory: Negative. Cardiovascular: Negative. Gastrointestinal: Negative. Genitourinary: Negative. Musculoskeletal: Negative. Skin: Negative. Neurological: Negative. Hematological: Negative. Psychiatric/Behavioral: Negative. I have reviewed the patient's medical history in detail; there are no changes to the history as noted in the electronic medical record.     Objective     Vitals:    23 1005 23 1030   BP: 140/90 134/80   Pulse: 66    Resp: 18    Temp: 97.6 °F (36.4 °C)    TempSrc: Temporal    SpO2: 96%    Weight: 106 kg (234 lb)    Height: 5' 9" (1.753 m)      Wt Readings from Last 3 Encounters:   09/28/23 106 kg (234 lb)   03/28/23 110 kg (243 lb 8 oz)   09/28/22 110 kg (242 lb)     Physical Exam  Vitals and nursing note reviewed. Constitutional:       General: He is not in acute distress. Appearance: Normal appearance. He is not ill-appearing. HENT:      Head: Atraumatic. Right Ear: Tympanic membrane normal.      Left Ear: Tympanic membrane normal.      Mouth/Throat:      Mouth: Mucous membranes are moist.      Pharynx: Oropharynx is clear. Eyes:      Conjunctiva/sclera: Conjunctivae normal.      Pupils: Pupils are equal, round, and reactive to light. Neck:      Thyroid: No thyromegaly. Vascular: No carotid bruit. Cardiovascular:      Rate and Rhythm: Normal rate and regular rhythm. Heart sounds: No murmur heard. Pulmonary:      Effort: Pulmonary effort is normal. No respiratory distress. Breath sounds: Normal breath sounds. Abdominal:      General: Bowel sounds are normal.      Palpations: Abdomen is soft. Tenderness: There is no abdominal tenderness. Musculoskeletal:         General: No deformity. Cervical back: Normal range of motion and neck supple. Right lower leg: No edema. Left lower leg: No edema. Lymphadenopathy:      Cervical: No cervical adenopathy. Skin:     General: Skin is warm and dry. Findings: No rash. Neurological:      Mental Status: He is alert. Psychiatric:         Mood and Affect: Mood normal.         Depression Screening and Follow-up Plan: Patient was screened for depression during today's encounter. They screened negative with a PHQ-2 score of 0. BMI Counseling: Body mass index is 34.56 kg/m². The BMI is above normal. Nutrition recommendations include reducing portion sizes, decreasing overall calorie intake and 3-5 servings of fruits/vegetables daily. Exercise recommendations include exercising 3-5 times per week.     ALLERGIES:  No Known Allergies    Current Medications Current Outpatient Medications   Medication Sig Dispense Refill   • amLODIPine (NORVASC) 10 mg tablet Take 1 tablet (10 mg total) by mouth daily 30 tablet 11   • atorvastatin (LIPITOR) 20 mg tablet Take 1 tablet (20 mg total) by mouth daily 30 tablet 11   • benazepril (LOTENSIN) 40 MG tablet Take 1 tablet (40 mg total) by mouth daily 30 tablet 11   • calcium carbonate (OS-HORACE) 600 MG tablet Take 660 mg by mouth daily     • carvedilol (COREG) 6.25 mg tablet Take 1 tablet (6.25 mg total) by mouth 2 (two) times a day 60 tablet 11   • Cholecalciferol (VITAMIN D) 2000 units tablet Take 5,000 Units by mouth daily      • hydrochlorothiazide (HYDRODIURIL) 25 mg tablet Take 1 tablet (25 mg total) by mouth daily 30 tablet 11   • ipratropium (ATROVENT) 0.06 % nasal spray 2 sprays into each nostril 3 (three) times a day as needed       • magnesium gluconate (MAGONATE) 30 MG tablet Take 30 mg by mouth 2 (two) times a day     • Multiple Vitamin (MULTI-VITAMIN) tablet Take 1 tablet by mouth      • Omega-3 Fatty Acids (fish oil) 1,000 mg Take 1,000 mg by mouth 2 (two) times a day       No current facility-administered medications for this visit.          Health Maintenance     Health Maintenance   Topic Date Due   • HIV Screening  Never done   • COVID-19 Vaccine (3 - Moderna series) 09/07/2021   • Pneumococcal Vaccine: 65+ Years (1 - PCV) Never done   • Influenza Vaccine (1) 09/01/2023   • Fall Risk  03/28/2024   • Medicare Annual Wellness Visit (AWV)  03/28/2024   • BMI: Followup Plan  04/02/2024   • Depression Screening  09/28/2024   • BMI: Adult  09/28/2024   • Colorectal Cancer Screening  06/05/2026   • Hepatitis C Screening  Completed   • HIB Vaccine  Aged Out   • IPV Vaccine  Aged Out   • Hepatitis A Vaccine  Aged Out   • Meningococcal ACWY Vaccine  Aged Out   • HPV Vaccine  Aged Out     Immunization History   Administered Date(s) Administered   • COVID-19 MODERNA VACC 0.5 ML IM 06/15/2021, 07/13/2021   • Influenza Quadrivalent Preservative Free 3 years and older IM 01/18/2017   • Influenza, injectable, quadrivalent, preservative free 0.5 mL 10/23/2019   • Influenza, recombinant, quadrivalent,injectable, preservative free 09/08/2020, 09/28/2021   • Influenza, seasonal, injectable 10/21/2014   • Influenza, seasonal, injectable, preservative free 01/06/2016   • Tdap 07/01/2015       SEGUNDO Vázquez

## 2023-09-28 NOTE — ASSESSMENT & PLAN NOTE
Lost about 10 pounds since March, trying to eat more fruits/vegetables. [place]our[place]

## 2023-09-28 NOTE — ASSESSMENT & PLAN NOTE
Last , overdue for repeat Lipid Panel, plans to complete this today, after office visit. Continue atorvastatin. Stop baby aspirin, which he is taking for primary prevention.

## 2023-09-29 ENCOUNTER — TELEPHONE (OUTPATIENT)
Dept: FAMILY MEDICINE CLINIC | Facility: CLINIC | Age: 68
End: 2023-09-29

## 2023-09-29 DIAGNOSIS — E11.9 NEW ONSET TYPE 2 DIABETES MELLITUS (HCC): Primary | ICD-10-CM

## 2023-09-29 RX ORDER — METFORMIN HYDROCHLORIDE 500 MG/1
500 TABLET, EXTENDED RELEASE ORAL 2 TIMES DAILY WITH MEALS
Qty: 60 TABLET | Refills: 5 | Status: SHIPPED | OUTPATIENT
Start: 2023-09-29

## 2023-09-29 NOTE — TELEPHONE ENCOUNTER
----- Message from Blanca Corona sent at 9/29/2023  7:17 AM EDT -----  Please contact Ana Pablo. Blood work shows new onset of diabetes. Start Metformin 1 tablet twice daily with meals. Walk every day on the trail. Avoid sweets, sugar, sugary drinks. Continue to work on weight loss. Please see me again in 1 month for recheck.

## 2023-10-02 NOTE — TELEPHONE ENCOUNTER
Patient returned the call to advise that he is refusing medication for diabetes at this time. States he is going to attempt to lower his numbers naturally. Please cancel the script for Metformin.

## 2024-03-25 ENCOUNTER — RA CDI HCC (OUTPATIENT)
Dept: OTHER | Facility: HOSPITAL | Age: 69
End: 2024-03-25

## 2024-03-29 ENCOUNTER — OFFICE VISIT (OUTPATIENT)
Dept: FAMILY MEDICINE CLINIC | Facility: CLINIC | Age: 69
End: 2024-03-29
Payer: MEDICARE

## 2024-03-29 ENCOUNTER — TELEPHONE (OUTPATIENT)
Dept: FAMILY MEDICINE CLINIC | Facility: CLINIC | Age: 69
End: 2024-03-29

## 2024-03-29 VITALS
HEIGHT: 69 IN | HEART RATE: 70 BPM | TEMPERATURE: 97.8 F | WEIGHT: 233.6 LBS | OXYGEN SATURATION: 98 % | BODY MASS INDEX: 34.6 KG/M2 | DIASTOLIC BLOOD PRESSURE: 82 MMHG | RESPIRATION RATE: 16 BRPM | SYSTOLIC BLOOD PRESSURE: 128 MMHG

## 2024-03-29 DIAGNOSIS — I10 HYPERTENSION, UNSPECIFIED TYPE: ICD-10-CM

## 2024-03-29 DIAGNOSIS — E78.5 HYPERLIPIDEMIA, UNSPECIFIED HYPERLIPIDEMIA TYPE: ICD-10-CM

## 2024-03-29 DIAGNOSIS — E66.09 CLASS 1 OBESITY DUE TO EXCESS CALORIES WITH SERIOUS COMORBIDITY AND BODY MASS INDEX (BMI) OF 34.0 TO 34.9 IN ADULT: ICD-10-CM

## 2024-03-29 DIAGNOSIS — E11.9 TYPE 2 DIABETES MELLITUS WITHOUT COMPLICATION, WITHOUT LONG-TERM CURRENT USE OF INSULIN (HCC): ICD-10-CM

## 2024-03-29 DIAGNOSIS — Z00.00 MEDICARE ANNUAL WELLNESS VISIT, SUBSEQUENT: Primary | ICD-10-CM

## 2024-03-29 DIAGNOSIS — Z23 ENCOUNTER FOR IMMUNIZATION: ICD-10-CM

## 2024-03-29 PROBLEM — E66.01 CLASS 2 SEVERE OBESITY DUE TO EXCESS CALORIES WITH SERIOUS COMORBIDITY AND BODY MASS INDEX (BMI) OF 35.0 TO 35.9 IN ADULT (HCC): Status: ACTIVE | Noted: 2024-03-29

## 2024-03-29 PROBLEM — E66.812 CLASS 2 SEVERE OBESITY DUE TO EXCESS CALORIES WITH SERIOUS COMORBIDITY AND BODY MASS INDEX (BMI) OF 35.0 TO 35.9 IN ADULT (HCC): Status: ACTIVE | Noted: 2024-03-29

## 2024-03-29 PROBLEM — E66.811 CLASS 1 OBESITY DUE TO EXCESS CALORIES WITH SERIOUS COMORBIDITY AND BODY MASS INDEX (BMI) OF 34.0 TO 34.9 IN ADULT: Status: ACTIVE | Noted: 2024-03-29

## 2024-03-29 LAB
CREAT UR-MCNC: 407 MG/DL
LEFT EYE DIABETIC RETINOPATHY: ABNORMAL
LEFT EYE IMAGE QUALITY: ABNORMAL
LEFT EYE MACULAR EDEMA: ABNORMAL
LEFT EYE OTHER RETINOPATHY: ABNORMAL
MICROALBUMIN UR-MCNC: 117.1 MG/L
MICROALBUMIN/CREAT 24H UR: 29 MG/G CREATININE (ref 0–30)
RIGHT EYE DIABETIC RETINOPATHY: ABNORMAL
RIGHT EYE IMAGE QUALITY: ABNORMAL
RIGHT EYE MACULAR EDEMA: ABNORMAL
RIGHT EYE OTHER RETINOPATHY: ABNORMAL
SEVERITY (EYE EXAM): ABNORMAL
SL AMB POCT HEMOGLOBIN AIC: 7.6 (ref ?–6.5)

## 2024-03-29 PROCEDURE — 90677 PCV20 VACCINE IM: CPT | Performed by: NURSE PRACTITIONER

## 2024-03-29 PROCEDURE — 82043 UR ALBUMIN QUANTITATIVE: CPT | Performed by: NURSE PRACTITIONER

## 2024-03-29 PROCEDURE — 83036 HEMOGLOBIN GLYCOSYLATED A1C: CPT | Performed by: NURSE PRACTITIONER

## 2024-03-29 PROCEDURE — 82570 ASSAY OF URINE CREATININE: CPT | Performed by: NURSE PRACTITIONER

## 2024-03-29 PROCEDURE — G0438 PPPS, INITIAL VISIT: HCPCS | Performed by: NURSE PRACTITIONER

## 2024-03-29 PROCEDURE — G0009 ADMIN PNEUMOCOCCAL VACCINE: HCPCS | Performed by: NURSE PRACTITIONER

## 2024-03-29 PROCEDURE — 99214 OFFICE O/P EST MOD 30 MIN: CPT | Performed by: NURSE PRACTITIONER

## 2024-03-29 RX ORDER — METFORMIN HYDROCHLORIDE 500 MG/1
1000 TABLET, EXTENDED RELEASE ORAL 2 TIMES DAILY WITH MEALS
Qty: 360 TABLET | Refills: 3 | Status: SHIPPED | OUTPATIENT
Start: 2024-03-29

## 2024-03-29 NOTE — TELEPHONE ENCOUNTER
----- Message from SEGUNDO Medina sent at 3/29/2024  1:46 PM EDT -----  Please let Iván know, eye exam pictures were not adequate.   Please ask him to see an eye doctor for a diabetic eye exam.     Also please give him information for dermatology:  [facility]--[phone]  [facility]--[phone]  [facility] [phone]

## 2024-03-29 NOTE — PATIENT INSTRUCTIONS
Medicare Preventive Visit Patient Instructions  Thank you for completing your Welcome to Medicare Visit or Medicare Annual Wellness Visit today. Your next wellness visit will be due in one year (3/30/2025).  The screening/preventive services that you may require over the next 5-10 years are detailed below. Some tests may not apply to you based off risk factors and/or age. Screening tests ordered at today's visit but not completed yet may show as past due. Also, please note that scanned in results may not display below.  Preventive Screenings:  Service Recommendations Previous Testing/Comments   Colorectal Cancer Screening  Colonoscopy    Fecal Occult Blood Test (FOBT)/Fecal Immunochemical Test (FIT)  Fecal DNA/Cologuard Test  Flexible Sigmoidoscopy Age: 45-75 years old   Colonoscopy: every 10 years (May be performed more frequently if at higher risk)  OR  FOBT/FIT: every 1 year  OR  Cologuard: every 3 years  OR  Sigmoidoscopy: every 5 years  Screening may be recommended earlier than age 45 if at higher risk for colorectal cancer. Also, an individualized decision between you and your healthcare provider will decide whether screening between the ages of 76-85 would be appropriate. Colonoscopy: Not on file  FOBT/FIT: Not on file  Cologuard: 06/05/2023  Sigmoidoscopy: Not on file          Prostate Cancer Screening Individualized decision between patient and health care provider in men between ages of 55-69   Medicare will cover every 12 months beginning on the day after your 50th birthday PSA: 2.50 ng/mL           Hepatitis C Screening Once for adults born between 1945 and 1965  More frequently in patients at high risk for Hepatitis C Hep C Antibody: 02/20/2020        Diabetes Screening 1-2 times per year if you're at risk for diabetes or have pre-diabetes Fasting glucose: 336 mg/dL (9/28/2023)  A1C: 10.9 % (9/28/2023)      Cholesterol Screening Once every 5 years if you don't have a lipid disorder. May order more often  based on risk factors. Lipid panel: 09/28/2023         Other Preventive Screenings Covered by Medicare:  Abdominal Aortic Aneurysm (AAA) Screening: covered once if your at risk. You're considered to be at risk if you have a family history of AAA or a male between the age of 65-75 who smoking at least 100 cigarettes in your lifetime.  Lung Cancer Screening: covers low dose CT scan once per year if you meet all of the following conditions: (1) Age 55-77; (2) No signs or symptoms of lung cancer; (3) Current smoker or have quit smoking within the last 15 years; (4) You have a tobacco smoking history of at least 20 pack years (packs per day x number of years you smoked); (5) You get a written order from a healthcare provider.  Glaucoma Screening: covered annually if you're considered high risk: (1) You have diabetes OR (2) Family history of glaucoma OR (3)  aged 50 and older OR (4)  American aged 65 and older  Osteoporosis Screening: covered every 2 years if you meet one of the following conditions: (1) Have a vertebral abnormality; (2) On glucocorticoid therapy for more than 3 months; (3) Have primary hyperparathyroidism; (4) On osteoporosis medications and need to assess response to drug therapy.  HIV Screening: covered annually if you're between the age of 15-65. Also covered annually if you are younger than 15 and older than 65 with risk factors for HIV infection. For pregnant patients, it is covered up to 3 times per pregnancy.    Immunizations:  Immunization Recommendations   Influenza Vaccine Annual influenza vaccination during flu season is recommended for all persons aged >= 6 months who do not have contraindications   Pneumococcal Vaccine   * Pneumococcal conjugate vaccine = PCV13 (Prevnar 13), PCV15 (Vaxneuvance), PCV20 (Prevnar 20)  * Pneumococcal polysaccharide vaccine = PPSV23 (Pneumovax) Adults 19-63 yo with certain risk factors or if 65+ yo  If never received any pneumonia vaccine:  recommend Prevnar 20 (PCV20)  Give PCV20 if previously received 1 dose of PCV13 or PPSV23   Hepatitis B Vaccine 3 dose series if at intermediate or high risk (ex: diabetes, end stage renal disease, liver disease)   Respiratory syncytial virus (RSV) Vaccine - COVERED BY MEDICARE PART D  * RSVPreF3 (Arexvy) CDC recommends that adults 60 years of age and older may receive a single dose of RSV vaccine using shared clinical decision-making (SCDM)   Tetanus (Td) Vaccine - COST NOT COVERED BY MEDICARE PART B Following completion of primary series, a booster dose should be given every 10 years to maintain immunity against tetanus. Td may also be given as tetanus wound prophylaxis.   Tdap Vaccine - COST NOT COVERED BY MEDICARE PART B Recommended at least once for all adults. For pregnant patients, recommended with each pregnancy.   Shingles Vaccine (Shingrix) - COST NOT COVERED BY MEDICARE PART B  2 shot series recommended in those 19 years and older who have or will have weakened immune systems or those 50 years and older     Health Maintenance Due:      Topic Date Due   • Colorectal Cancer Screening  06/05/2026   • Hepatitis C Screening  Completed     Immunizations Due:      Topic Date Due   • Pneumococcal Vaccine: 65+ Years (1 of 2 - PCV) Never done   • Influenza Vaccine (1) 09/01/2023   • COVID-19 Vaccine (3 - 2023-24 season) 09/01/2023     Advance Directives   What are advance directives?  Advance directives are legal documents that state your wishes and plans for medical care. These plans are made ahead of time in case you lose your ability to make decisions for yourself. Advance directives can apply to any medical decision, such as the treatments you want, and if you want to donate organs.   What are the types of advance directives?  There are many types of advance directives, and each state has rules about how to use them. You may choose a combination of any of the following:  Living will:  This is a written record  of the treatment you want. You can also choose which treatments you do not want, which to limit, and which to stop at a certain time. This includes surgery, medicine, IV fluid, and tube feedings.   Durable power of  for healthcare (DPAHC):  This is a written record that states who you want to make healthcare choices for you when you are unable to make them for yourself. This person, called a proxy, is usually a family member or a friend. You may choose more than 1 proxy.  Do not resuscitate (DNR) order:  A DNR order is used in case your heart stops beating or you stop breathing. It is a request not to have certain forms of treatment, such as CPR. A DNR order may be included in other types of advance directives.  Medical directive:  This covers the care that you want if you are in a coma, near death, or unable to make decisions for yourself. You can list the treatments you want for each condition. Treatment may include pain medicine, surgery, blood transfusions, dialysis, IV or tube feedings, and a ventilator (breathing machine).  Values history:  This document has questions about your views, beliefs, and how you feel and think about life. This information can help others choose the care that you would choose.  Why are advance directives important?  An advance directive helps you control your care. Although spoken wishes may be used, it is better to have your wishes written down. Spoken wishes can be misunderstood, or not followed. Treatments may be given even if you do not want them. An advance directive may make it easier for your family to make difficult choices about your care.   Weight Management   Why it is important to manage your weight:  Being overweight increases your risk of health conditions such as heart disease, high blood pressure, type 2 diabetes, and certain types of cancer. It can also increase your risk for osteoarthritis, sleep apnea, and other respiratory problems. Aim for a slow, steady  weight loss. Even a small amount of weight loss can lower your risk of health problems.  How to lose weight safely:  A safe and healthy way to lose weight is to eat fewer calories and get regular exercise. You can lose up about 1 pound a week by decreasing the number of calories you eat by 500 calories each day.   Healthy meal plan for weight management:  A healthy meal plan includes a variety of foods, contains fewer calories, and helps you stay healthy. A healthy meal plan includes the following:  Eat whole-grain foods more often.  A healthy meal plan should contain fiber. Fiber is the part of grains, fruits, and vegetables that is not broken down by your body. Whole-grain foods are healthy and provide extra fiber in your diet. Some examples of whole-grain foods are whole-wheat breads and pastas, oatmeal, brown rice, and bulgur.  Eat a variety of vegetables every day.  Include dark, leafy greens such as spinach, kale, andreina greens, and mustard greens. Eat yellow and orange vegetables such as carrots, sweet potatoes, and winter squash.   Eat a variety of fruits every day.  Choose fresh or canned fruit (canned in its own juice or light syrup) instead of juice. Fruit juice has very little or no fiber.  Eat low-fat dairy foods.  Drink fat-free (skim) milk or 1% milk. Eat fat-free yogurt and low-fat cottage cheese. Try low-fat cheeses such as mozzarella and other reduced-fat cheeses.  Choose meat and other protein foods that are low in fat.  Choose beans or other legumes such as split peas or lentils. Choose fish, skinless poultry (chicken or turkey), or lean cuts of red meat (beef or pork). Before you cook meat or poultry, cut off any visible fat.   Use less fat and oil.  Try baking foods instead of frying them. Add less fat, such as margarine, sour cream, regular salad dressing and mayonnaise to foods. Eat fewer high-fat foods. Some examples of high-fat foods include french fries, doughnuts, ice cream, and  cakes.  Eat fewer sweets.  Limit foods and drinks that are high in sugar. This includes candy, cookies, regular soda, and sweetened drinks.  Exercise:  Exercise at least 30 minutes per day on most days of the week. Some examples of exercise include walking, biking, dancing, and swimming. You can also fit in more physical activity by taking the stairs instead of the elevator or parking farther away from stores. Ask your healthcare provider about the best exercise plan for you.      © Copyright [employer] 2018 Information is for End User's use only and may not be sold, redistributed or otherwise used for commercial purposes. All illustrations and images included in CareNotes® are the copyrighted property of [employer] or [employer]

## 2024-03-29 NOTE — ASSESSMENT & PLAN NOTE
Lab Results   Component Value Date    HGBA1C 7.6 (A) 03/29/2024     A1c is much improved, down to 7.6% from 10.9% at last office visit.   He eliminated soda from his diet, and is taking metformin  mg twice daily. Will increase to 1000 mg twice daily.   Goal <7%.   Iris eye exam today.   Foot exam today.   Urine for albumin creatinine ratio today.

## 2024-03-29 NOTE — PROGRESS NOTES
Assessment and Plan:     Problem List Items Addressed This Visit       Hypertension     Stable blood pressure on Coreg, benazepril, and hydrochlorothiazide.          Relevant Orders    POCT hemoglobin A1c (Completed)    Comprehensive metabolic panel    CBC and differential    TSH, 3rd generation with Free T4 reflex    Hyperlipidemia     LDL 93. Continue atorvastatin.   Repeat lipid panel.          Relevant Orders    Lipid Panel with Direct LDL reflex    Class 1 obesity due to excess calories with serious comorbidity and body mass index (BMI) of 34.0 to 34.9 in adult     Continue to work on walking more.          Type 2 diabetes mellitus without complication, without long-term current use of insulin ([facility])       Lab Results   Component Value Date    HGBA1C 7.6 (A) 03/29/2024     A1c is much improved, down to 7.6% from 10.9% at last office visit.   He eliminated soda from his diet, and is taking metformin  mg twice daily. Will increase to 1000 mg twice daily.   Goal <7%.   Iris eye exam today.   Foot exam today.   Urine for albumin creatinine ratio today.          Relevant Medications    metFORMIN (GLUCOPHAGE-XR) 500 mg 24 hr tablet    Other Relevant Orders    POCT hemoglobin A1c (Completed)    Comprehensive metabolic panel    CBC and differential    Albumin / creatinine urine ratio    IRIS Diabetic eye exam (Completed)     Other Visit Diagnoses       Medicare annual wellness visit, subsequent    -  Primary    Encounter for immunization        Relevant Orders    Pneumococcal Conjugate Vaccine 20-valent (Pcv20) (Completed)          Repeat blood work at this time.   Follow up in 6 months or sooner if needed.     Depression Screening and Follow-up Plan: Patient was screened for depression during today's encounter. They screened negative with a PHQ-2 score of 0.      Preventive health issues were discussed with patient, and age appropriate screening tests were ordered as noted in patient's After Visit  Summary.  Personalized health advice and appropriate referrals for health education or preventive services given if needed, as noted in patient's After Visit Summary.     History of Present Illness:     Patient presents for a Medicare Wellness Visit    Torey Deal is a 68 year old male presenting today for 6 month check up.     He is trying to walk more.   Is overall sedentary, likes to watch movies.     Stable weight.   Taking all medications as prescribed.            Patient Care Team:  SEGUNDO Medina as PCP - General (Family Medicine)  Cary Martin MD     Review of Systems:     Review of Systems   Constitutional: Negative.    HENT: Negative.     Respiratory: Negative.     Cardiovascular: Negative.    Gastrointestinal: Negative.    Genitourinary: Negative.    Musculoskeletal: Negative.    Skin: Negative.    Neurological: Negative.    Hematological: Negative.    Psychiatric/Behavioral: Negative.          Problem List:     Patient Active Problem List   Diagnosis    Hypertension    Hyperlipidemia    Class 1 obesity due to excess calories with serious comorbidity and body mass index (BMI) of 34.0 to 34.9 in adult    Type 2 diabetes mellitus without complication, without long-term current use of insulin ([facility])      Past Medical and Surgical History:     Past Medical History:   Diagnosis Date    Allergic rhinitis     last assessed: 3/10/2014    Insomnia     last assessed: 3/10/2014    Vitamin D deficiency     last assessed: 3/10/2014     History reviewed. No pertinent surgical history.   Family History:     Family History   Problem Relation Age of Onset    Pathological gambling Mother     Diabetes Father         mellitus    Ulcers Father         gastrkic    Stroke Brother         syndrome      Social History:     Social History     Socioeconomic History    Marital status: Single     Spouse name: None    Number of children: None    Years of education: None    Highest education level: None   Occupational History     None   Tobacco Use    Smoking status: Never    Smokeless tobacco: Never   Vaping Use    Vaping status: Never Used   Substance and Sexual Activity    Alcohol use: No    Drug use: No    Sexual activity: Yes   Other Topics Concern    None   Social History Narrative    None     Social Determinants of Health     Financial Resource Strain: Low Risk  (3/28/2023)    Overall Financial Resource Strain (CARDIA)     Difficulty of Paying Living Expenses: Not very hard   Food Insecurity: No Food Insecurity (3/29/2024)    Hunger Vital Sign     Worried About Running Out of Food in the Last Year: Never true     Ran Out of Food in the Last Year: Never true   Transportation Needs: No Transportation Needs (3/29/2024)    PRAPARE - Transportation     Lack of Transportation (Medical): No     Lack of Transportation (Non-Medical): No   Physical Activity: Not on file   Stress: Not on file   Social Connections: Not on file   Intimate Partner Violence: Not on file   Housing Stability: Low Risk  (3/29/2024)    Housing Stability Vital Sign     Unable to Pay for Housing in the Last Year: No     Number of Places Lived in the Last Year: 1     Unstable Housing in the Last Year: No      Medications and Allergies:     Current Outpatient Medications   Medication Sig Dispense Refill    amLODIPine (NORVASC) 10 mg tablet Take 1 tablet (10 mg total) by mouth daily 30 tablet 11    atorvastatin (LIPITOR) 20 mg tablet Take 1 tablet (20 mg total) by mouth daily 30 tablet 11    benazepril (LOTENSIN) 40 MG tablet Take 1 tablet (40 mg total) by mouth daily 30 tablet 11    calcium carbonate (OS-HORACE) 600 MG tablet Take 660 mg by mouth daily      carvedilol (COREG) 6.25 mg tablet Take 1 tablet (6.25 mg total) by mouth 2 (two) times a day 60 tablet 11    Cholecalciferol (VITAMIN D) 2000 units tablet Take 5,000 Units by mouth daily       hydrochlorothiazide (HYDRODIURIL) 25 mg tablet Take 1 tablet (25 mg total) by mouth daily 30 tablet 11    ipratropium (ATROVENT)  0.06 % nasal spray 2 sprays into each nostril 3 (three) times a day as needed        magnesium gluconate (MAGONATE) 30 MG tablet Take 30 mg by mouth 2 (two) times a day      metFORMIN (GLUCOPHAGE-XR) 500 mg 24 hr tablet Take 2 tablets (1,000 mg total) by mouth 2 (two) times a day with meals 360 tablet 3    Multiple Vitamin (MULTI-VITAMIN) tablet Take 1 tablet by mouth       Omega-3 Fatty Acids (fish oil) 1,000 mg Take 1,000 mg by mouth 2 (two) times a day       No current facility-administered medications for this visit.     No Known Allergies   Immunizations:     Immunization History   Administered Date(s) Administered    COVID-19 MODERNA VACC 0.5 ML IM 06/15/2021, 07/13/2021    Influenza Quadrivalent Preservative Free 3 years and older IM 01/18/2017    Influenza, injectable, quadrivalent, preservative free 0.5 mL 10/23/2019    Influenza, recombinant, quadrivalent,injectable, preservative free 09/08/2020, 09/28/2021    Influenza, seasonal, injectable 10/21/2014    Influenza, seasonal, injectable, preservative free 01/06/2016    Pneumococcal Conjugate Vaccine 20-valent (Pcv20), Polysace 03/29/2024    Tdap 07/01/2015      Health Maintenance:         Topic Date Due    Colorectal Cancer Screening  06/05/2026    Hepatitis C Screening  Completed         Topic Date Due    Influenza Vaccine (1) 09/01/2023    COVID-19 Vaccine (3 - 2023-24 season) 09/01/2023      Medicare Screening Tests and Risk Assessments:     Torey is here for his Initial Wellness visit. Last Medicare Wellness visit information reviewed, patient interviewed and updates made to the record today.      Health Risk Assessment:   Patient rates overall health as good. Patient feels that their physical health rating is slightly better. Patient is very satisfied with their life. Eyesight was rated as same. Hearing was rated as same. Patient feels that their emotional and mental health rating is slightly better. Patients states they are never, rarely angry.  Patient states they are never, rarely unusually tired/fatigued. Pain experienced in the last 7 days has been none. Patient states that he has experienced no weight loss or gain in last 6 months.     Depression Screening:   PHQ-2 Score: 0      Fall Risk Screening:   In the past year, patient has experienced: no history of falling in past year      Home Safety:  Patient does not have trouble with stairs inside or outside of their home. Patient has working smoke alarms and has working carbon monoxide detector. Home safety hazards include: none.     Nutrition:   Current diet is Regular.     Medications:   Patient is currently taking over-the-counter supplements. OTC medications include: see medication list. Patient is able to manage medications.     Activities of Daily Living (ADLs)/Instrumental Activities of Daily Living (IADLs):   Walk and transfer into and out of bed and chair?: Yes  Dress and groom yourself?: Yes    Bathe or shower yourself?: Yes    Feed yourself? Yes  Do your laundry/housekeeping?: Yes  Manage your money, pay your bills and track your expenses?: Yes  Make your own meals?: Yes    Do your own shopping?: Yes    Previous Hospitalizations:   Any hospitalizations or ED visits within the last 12 months?: No      Advance Care Planning:   Living will: No    Durable POA for healthcare: No    Advanced directive: No    Advanced directive counseling given: Yes    ACP document given: Yes      Comments:           Cognitive Screening:   Provider or family/friend/caregiver concerned regarding cognition?: No    PREVENTIVE SCREENINGS      Cardiovascular Screening:    General: Screening Not Indicated and History Lipid Disorder      Diabetes Screening:     General: Screening Not Indicated and History Diabetes    Due for: Blood Glucose      Colorectal Cancer Screening:     General: Screening Current    Due for: Cologuard      Prostate Cancer Screening:    General: Screening Current    Due for: PSA      Osteoporosis  "Screening:    General: Screening Not Indicated      Abdominal Aortic Aneurysm (AAA) Screening:    Risk factors include: age between 65-74 yo        General: Screening Not Indicated      Lung Cancer Screening:     General: Screening Not Indicated      Hepatitis C Screening:    General: Screening Current    Screening, Brief Intervention, and Referral to Treatment (SBIRT)    Screening      Single Item Drug Screening:  How often have you used an illegal drug (including marijuana) or a prescription medication for non-medical reasons in the past year? never    Single Item Drug Screen Score: 0  Interpretation: Negative screen for possible drug use disorder    Brief Intervention  Alcohol & drug use screenings were reviewed. No concerns regarding substance use disorder identified.     No results found.     Physical Exam:     /82 (BP Location: Right arm, Patient Position: Sitting, Cuff Size: Large)   Pulse 70   Temp 97.8 °F (36.6 °C) (Temporal)   Resp 16   Ht 5' 9\" (1.753 m)   Wt 106 kg (233 lb 9.6 oz)   SpO2 98%   BMI 34.50 kg/m²     Physical Exam  Vitals and nursing note reviewed.   Constitutional:       General: He is not in acute distress.     Appearance: Normal appearance. He is not ill-appearing.   HENT:      Head: Atraumatic.      Right Ear: Tympanic membrane normal.      Left Ear: Tympanic membrane normal.      Mouth/Throat:      Mouth: Mucous membranes are moist.      Pharynx: Oropharynx is clear.   Eyes:      Conjunctiva/sclera: Conjunctivae normal.      Pupils: Pupils are equal, round, and reactive to light.   Neck:      Thyroid: No thyromegaly.      Vascular: No carotid bruit.   Cardiovascular:      Rate and Rhythm: Normal rate and regular rhythm.      Pulses: no weak pulses.           Dorsalis pedis pulses are 2+ on the right side and 2+ on the left side.        Posterior tibial pulses are 2+ on the right side and 2+ on the left side.      Heart sounds: No murmur heard.  Pulmonary:      Effort: " Pulmonary effort is normal. No respiratory distress.      Breath sounds: Normal breath sounds. No wheezing or rales.   Musculoskeletal:      Cervical back: Normal range of motion and neck supple.      Right lower leg: No edema.      Left lower leg: No edema.   Feet:      Right foot:      Skin integrity: Dry skin present. No ulcer, skin breakdown, erythema, warmth or callus.      Left foot:      Skin integrity: Dry skin present. No ulcer, skin breakdown, erythema, warmth or callus.   Lymphadenopathy:      Cervical: No cervical adenopathy.   Skin:     General: Skin is warm and dry.      Findings: No rash.      Comments: Right lower leg, anterior ankle with 2 cm round skin lesion, brown in color.      Neurological:      Mental Status: He is alert.   Psychiatric:         Mood and Affect: Mood normal.        Diabetic Foot Exam    Patient's shoes and socks removed.    Right Foot/Ankle   Right Foot Inspection  Skin Exam: skin normal, skin intact and dry skin. No warmth, no callus, no erythema, no maceration, no abnormal color, no pre-ulcer, no ulcer and no callus.     Toe Exam: ROM and strength within normal limits.     Sensory   Proprioception: intact  Monofilament testing: intact    Vascular  Capillary refills: < 3 seconds  The right DP pulse is 2+. The right PT pulse is 2+.     Left Foot/Ankle  Left Foot Inspection  Skin Exam: skin normal, skin intact and dry skin. No warmth, no erythema, no maceration, normal color, no pre-ulcer, no ulcer and no callus.     Toe Exam: ROM and strength within normal limits.     Sensory   Proprioception: intact  Monofilament testing: intact    Vascular  Capillary refills: < 3 seconds  The left DP pulse is 2+. The left PT pulse is 2+.     Assign Risk Category  No deformity present  No loss of protective sensation  No weak pulses  Risk: 0      SEGUNDO Medina

## 2024-04-01 ENCOUNTER — TELEPHONE (OUTPATIENT)
Dept: FAMILY MEDICINE CLINIC | Facility: CLINIC | Age: 69
End: 2024-04-01

## 2024-04-01 NOTE — TELEPHONE ENCOUNTER
----- Message from SEGUNDO Medina sent at 3/29/2024  1:46 PM EDT -----  Please let Iván know, eye exam pictures were not adequate.   Please ask him to see an eye doctor for a diabetic eye exam.     Also please give him information for dermatology:  [facility]--[phone]  [facility] dermatology--[phone]  [facility] [phone]

## 2024-04-26 DIAGNOSIS — E78.5 HYPERLIPIDEMIA, UNSPECIFIED HYPERLIPIDEMIA TYPE: ICD-10-CM

## 2024-04-26 DIAGNOSIS — I10 HYPERTENSION, UNSPECIFIED TYPE: ICD-10-CM

## 2024-04-26 RX ORDER — ATORVASTATIN CALCIUM 20 MG/1
20 TABLET, FILM COATED ORAL DAILY
Qty: 30 TABLET | Refills: 5 | Status: SHIPPED | OUTPATIENT
Start: 2024-04-26

## 2024-04-26 RX ORDER — CARVEDILOL 6.25 MG/1
6.25 TABLET ORAL 2 TIMES DAILY
Qty: 60 TABLET | Refills: 5 | Status: SHIPPED | OUTPATIENT
Start: 2024-04-26

## 2024-04-26 RX ORDER — AMLODIPINE BESYLATE 10 MG/1
10 TABLET ORAL DAILY
Qty: 30 TABLET | Refills: 5 | Status: SHIPPED | OUTPATIENT
Start: 2024-04-26

## 2024-04-26 RX ORDER — BENAZEPRIL HYDROCHLORIDE 40 MG/1
40 TABLET ORAL DAILY
Qty: 30 TABLET | Refills: 5 | Status: SHIPPED | OUTPATIENT
Start: 2024-04-26

## 2024-04-26 RX ORDER — HYDROCHLOROTHIAZIDE 25 MG/1
25 TABLET ORAL DAILY
Qty: 30 TABLET | Refills: 5 | Status: SHIPPED | OUTPATIENT
Start: 2024-04-26

## 2024-09-24 ENCOUNTER — RA CDI HCC (OUTPATIENT)
Dept: OTHER | Facility: HOSPITAL | Age: 69
End: 2024-09-24

## 2024-09-30 ENCOUNTER — OFFICE VISIT (OUTPATIENT)
Dept: FAMILY MEDICINE CLINIC | Facility: CLINIC | Age: 69
End: 2024-09-30
Payer: MEDICARE

## 2024-09-30 VITALS
RESPIRATION RATE: 16 BRPM | OXYGEN SATURATION: 96 % | SYSTOLIC BLOOD PRESSURE: 120 MMHG | DIASTOLIC BLOOD PRESSURE: 86 MMHG | BODY MASS INDEX: 34.63 KG/M2 | HEART RATE: 68 BPM | WEIGHT: 233.8 LBS | HEIGHT: 69 IN | TEMPERATURE: 97.6 F

## 2024-09-30 DIAGNOSIS — Z12.5 PROSTATE CANCER SCREENING: ICD-10-CM

## 2024-09-30 DIAGNOSIS — E78.5 HYPERLIPIDEMIA, UNSPECIFIED HYPERLIPIDEMIA TYPE: ICD-10-CM

## 2024-09-30 DIAGNOSIS — E11.9 TYPE 2 DIABETES MELLITUS WITHOUT COMPLICATION, WITHOUT LONG-TERM CURRENT USE OF INSULIN (HCC): Primary | ICD-10-CM

## 2024-09-30 DIAGNOSIS — I10 HYPERTENSION, UNSPECIFIED TYPE: ICD-10-CM

## 2024-09-30 PROBLEM — E11.65 TYPE 2 DIABETES MELLITUS WITH HYPERGLYCEMIA, WITHOUT LONG-TERM CURRENT USE OF INSULIN (HCC): Status: ACTIVE | Noted: 2024-03-29

## 2024-09-30 LAB — SL AMB POCT HEMOGLOBIN AIC: 8.4 (ref ?–6.5)

## 2024-09-30 PROCEDURE — 83036 HEMOGLOBIN GLYCOSYLATED A1C: CPT | Performed by: NURSE PRACTITIONER

## 2024-09-30 PROCEDURE — 99214 OFFICE O/P EST MOD 30 MIN: CPT | Performed by: NURSE PRACTITIONER

## 2024-09-30 RX ORDER — METFORMIN HCL 500 MG
1000 TABLET, EXTENDED RELEASE 24 HR ORAL 2 TIMES DAILY WITH MEALS
Qty: 360 TABLET | Refills: 3 | Status: SHIPPED | OUTPATIENT
Start: 2024-09-30

## 2024-09-30 NOTE — ASSESSMENT & PLAN NOTE
Lab Results   Component Value Date    HGBA1C 8.4 (A) 09/30/2024     A1c increased from 7.6% to 8.4%.   Advised to decrease candy intake, walk every day.   Has only been taking metformin  mg daily, will increase to 1000 mg twice daily.   Schedule eye exam.   I would like him to follow up in 3 months, but cost is factor, state co-pay is >$100 for office visit.   Therefore will do 6 month follow up.     Orders:    metFORMIN (GLUCOPHAGE-XR) 500 mg 24 hr tablet; Take 2 tablets (1,000 mg total) by mouth 2 (two) times a day with meals    POCT hemoglobin A1c

## 2024-09-30 NOTE — PATIENT INSTRUCTIONS
Take Metformin 2 pills with breakfast and 2 pills with dinner.  Cut back on candy pumpkins.  Walk every day.   Schedule eye exam.  Do blood work.   Do shingles vaccines at your local pharmacy.   Follow up in 6 months.

## 2024-09-30 NOTE — PROGRESS NOTES
Ambulatory Visit  Name: Torey Deal      : 1955      MRN: [id number]  Encounter Provider: SEGUNDO Medina  Encounter Date: 2024   Encounter department: [facility]    Assessment & Plan  Type 2 diabetes mellitus without complication, without long-term current use of insulin ([facility])    Lab Results   Component Value Date    HGBA1C 8.4 (A) 2024     A1c increased from 7.6% to 8.4%.   Advised to decrease candy intake, walk every day.   Has only been taking metformin  mg daily, will increase to 1000 mg twice daily.   Schedule eye exam.   I would like him to follow up in 3 months, but cost is factor, state co-pay is >$100 for office visit.   Therefore will do 6 month follow up.     Orders:    metFORMIN (GLUCOPHAGE-XR) 500 mg 24 hr tablet; Take 2 tablets (1,000 mg total) by mouth 2 (two) times a day with meals    POCT hemoglobin A1c    Hypertension, unspecified type  Stable blood pressure on amlodipine, coreg, benazepril, and HCTZ.        Hyperlipidemia, unspecified hyperlipidemia type  LDL 93. Continue atorvastatin and lifestyle modifications.        Prostate cancer screening    Orders:    PSA, Total Screen; Future      Patient Instructions:  Take Metformin 2 pills with breakfast and 2 pills with dinner.  Cut back on candy pumpkins.  Walk every day.   Schedule eye exam.  Do blood work.   Do shingles vaccines at your local pharmacy.   6 month recheck.       History of Present Illness     Torey Deal is a 68 year old male presenting today for 6 month check up.     Not walking for exercise.   Soda 16 ounces soda every other week.   Eating junk food--sweets--  Reeses-pumpkins 8 yesterday.    Typical day:  No breakfast  Salads lunch  Stir palma dinner often  Not going out to eat much.  Snacks on sweets/candy.    Eye exam to schedule.                   History obtained from : patient  Review of Systems   Constitutional: Negative.    HENT: Negative.     Respiratory: Negative.    "  Cardiovascular: Negative.    Gastrointestinal: Negative.    Genitourinary: Negative.    Musculoskeletal: Negative.    Skin: Negative.    Neurological: Negative.    Hematological: Negative.    Psychiatric/Behavioral: Negative.       Current Outpatient Medications on File Prior to Visit   Medication Sig Dispense Refill    amLODIPine (NORVASC) 10 mg tablet TAKE 1 TABLET (10 MG TOTAL) BY MOUTH DAILY 30 tablet 5    atorvastatin (LIPITOR) 20 mg tablet TAKE 1 TABLET (20 MG TOTAL) BY MOUTH DAILY 30 tablet 5    benazepril (LOTENSIN) 40 MG tablet TAKE 1 TABLET (40 MG TOTAL) BY MOUTH DAILY 30 tablet 5    calcium carbonate (OS-HORACE) 600 MG tablet Take 660 mg by mouth daily      carvedilol (COREG) 6.25 mg tablet TAKE 1 TABLET (6.25 MG TOTAL) BY MOUTH 2 (TWO) TIMES A DAY 60 tablet 5    Cholecalciferol (VITAMIN D) 2000 units tablet Take 5,000 Units by mouth daily       hydroCHLOROthiazide 25 mg tablet TAKE 1 TABLET (25 MG TOTAL) BY MOUTH DAILY 30 tablet 5    ipratropium (ATROVENT) 0.06 % nasal spray 2 sprays into each nostril 3 (three) times a day as needed        magnesium gluconate (MAGONATE) 30 MG tablet Take 30 mg by mouth 2 (two) times a day      Multiple Vitamin (MULTI-VITAMIN) tablet Take 1 tablet by mouth       Omega-3 Fatty Acids (fish oil) 1,000 mg Take 1,000 mg by mouth 2 (two) times a day      [DISCONTINUED] metFORMIN (GLUCOPHAGE-XR) 500 mg 24 hr tablet Take 2 tablets (1,000 mg total) by mouth 2 (two) times a day with meals 360 tablet 3     No current facility-administered medications on file prior to visit.          Objective     /86 (BP Location: Left arm, Patient Position: Sitting, Cuff Size: Large)   Pulse 68   Temp 97.6 °F (36.4 °C) (Temporal)   Resp 16   Ht 5' 9\" (1.753 m)   Wt 106 kg (233 lb 12.8 oz)   SpO2 96%   BMI 34.53 kg/m²     Physical Exam  Vitals and nursing note reviewed.   Constitutional:       General: He is not in acute distress.     Appearance: Normal appearance. He is not " ill-appearing.   HENT:      Head: Atraumatic.      Right Ear: Tympanic membrane normal.      Left Ear: Tympanic membrane normal.      Mouth/Throat:      Mouth: Mucous membranes are moist.      Pharynx: Oropharynx is clear.   Eyes:      Conjunctiva/sclera: Conjunctivae normal.      Pupils: Pupils are equal, round, and reactive to light.   Neck:      Thyroid: No thyromegaly.      Vascular: No carotid bruit.   Cardiovascular:      Rate and Rhythm: Normal rate and regular rhythm.      Heart sounds: No murmur heard.  Pulmonary:      Effort: Pulmonary effort is normal. No respiratory distress.      Breath sounds: Normal breath sounds. No wheezing or rales.   Musculoskeletal:      Cervical back: Normal range of motion and neck supple.      Right lower leg: No edema.      Left lower leg: No edema.   Lymphadenopathy:      Cervical: No cervical adenopathy.   Neurological:      Mental Status: He is alert.   Psychiatric:         Mood and Affect: Mood normal.

## 2024-10-30 DIAGNOSIS — E78.5 HYPERLIPIDEMIA, UNSPECIFIED HYPERLIPIDEMIA TYPE: ICD-10-CM

## 2024-10-30 DIAGNOSIS — I10 HYPERTENSION, UNSPECIFIED TYPE: ICD-10-CM

## 2024-10-31 RX ORDER — AMLODIPINE BESYLATE 10 MG/1
10 TABLET ORAL DAILY
Qty: 30 TABLET | Refills: 5 | Status: SHIPPED | OUTPATIENT
Start: 2024-10-31

## 2024-10-31 RX ORDER — CARVEDILOL 6.25 MG/1
6.25 TABLET ORAL 2 TIMES DAILY
Qty: 60 TABLET | Refills: 5 | Status: SHIPPED | OUTPATIENT
Start: 2024-10-31

## 2024-10-31 RX ORDER — ATORVASTATIN CALCIUM 20 MG/1
20 TABLET, FILM COATED ORAL DAILY
Qty: 30 TABLET | Refills: 0 | Status: SHIPPED | OUTPATIENT
Start: 2024-10-31

## 2024-10-31 RX ORDER — BENAZEPRIL HYDROCHLORIDE 40 MG/1
40 TABLET ORAL DAILY
Qty: 30 TABLET | Refills: 0 | Status: SHIPPED | OUTPATIENT
Start: 2024-10-31

## 2024-10-31 RX ORDER — HYDROCHLOROTHIAZIDE 25 MG/1
25 TABLET ORAL DAILY
Qty: 30 TABLET | Refills: 0 | Status: SHIPPED | OUTPATIENT
Start: 2024-10-31

## 2024-10-31 NOTE — TELEPHONE ENCOUNTER
Patient needs updated blood work and has previously placed orders. Please contact patient to go for labs. Courtesy refill provided.  Lipid Panel  CMP

## 2025-03-19 ENCOUNTER — APPOINTMENT (OUTPATIENT)
Dept: LAB | Facility: CLINIC | Age: 70
End: 2025-03-19
Payer: MEDICARE

## 2025-03-19 DIAGNOSIS — I10 HYPERTENSION, UNSPECIFIED TYPE: ICD-10-CM

## 2025-03-19 DIAGNOSIS — E78.5 HYPERLIPIDEMIA, UNSPECIFIED HYPERLIPIDEMIA TYPE: ICD-10-CM

## 2025-03-19 DIAGNOSIS — E11.9 TYPE 2 DIABETES MELLITUS WITHOUT COMPLICATION, WITHOUT LONG-TERM CURRENT USE OF INSULIN (HCC): ICD-10-CM

## 2025-03-19 DIAGNOSIS — Z12.5 PROSTATE CANCER SCREENING: ICD-10-CM

## 2025-03-19 LAB
ALBUMIN SERPL BCG-MCNC: 4.3 G/DL (ref 3.5–5)
ALP SERPL-CCNC: 89 U/L (ref 34–104)
ALT SERPL W P-5'-P-CCNC: 21 U/L (ref 7–52)
ANION GAP SERPL CALCULATED.3IONS-SCNC: 12 MMOL/L (ref 4–13)
AST SERPL W P-5'-P-CCNC: 20 U/L (ref 13–39)
BASOPHILS # BLD AUTO: 0.06 THOUSANDS/ÂΜL (ref 0–0.1)
BASOPHILS NFR BLD AUTO: 1 % (ref 0–1)
BILIRUB SERPL-MCNC: 0.95 MG/DL (ref 0.2–1)
BUN SERPL-MCNC: 10 MG/DL (ref 5–25)
CALCIUM SERPL-MCNC: 9.5 MG/DL (ref 8.4–10.2)
CHLORIDE SERPL-SCNC: 102 MMOL/L (ref 96–108)
CHOLEST SERPL-MCNC: 127 MG/DL (ref ?–200)
CO2 SERPL-SCNC: 25 MMOL/L (ref 21–32)
CREAT SERPL-MCNC: 0.73 MG/DL (ref 0.6–1.3)
EOSINOPHIL # BLD AUTO: 0.13 THOUSAND/ÂΜL (ref 0–0.61)
EOSINOPHIL NFR BLD AUTO: 1 % (ref 0–6)
ERYTHROCYTE [DISTWIDTH] IN BLOOD BY AUTOMATED COUNT: 12.3 % (ref 11.6–15.1)
GFR SERPL CREATININE-BSD FRML MDRD: 95 ML/MIN/1.73SQ M
GLUCOSE P FAST SERPL-MCNC: 152 MG/DL (ref 65–99)
HCT VFR BLD AUTO: 46.7 % (ref 36.5–49.3)
HDLC SERPL-MCNC: 37 MG/DL
HGB BLD-MCNC: 15.5 G/DL (ref 12–17)
IMM GRANULOCYTES # BLD AUTO: 0.06 THOUSAND/UL (ref 0–0.2)
IMM GRANULOCYTES NFR BLD AUTO: 1 % (ref 0–2)
LDLC SERPL CALC-MCNC: 62 MG/DL (ref 0–100)
LYMPHOCYTES # BLD AUTO: 2.04 THOUSANDS/ÂΜL (ref 0.6–4.47)
LYMPHOCYTES NFR BLD AUTO: 17 % (ref 14–44)
MCH RBC QN AUTO: 30.1 PG (ref 26.8–34.3)
MCHC RBC AUTO-ENTMCNC: 33.2 G/DL (ref 31.4–37.4)
MCV RBC AUTO: 91 FL (ref 82–98)
MONOCYTES # BLD AUTO: 0.74 THOUSAND/ÂΜL (ref 0.17–1.22)
MONOCYTES NFR BLD AUTO: 6 % (ref 4–12)
NEUTROPHILS # BLD AUTO: 8.98 THOUSANDS/ÂΜL (ref 1.85–7.62)
NEUTS SEG NFR BLD AUTO: 74 % (ref 43–75)
NRBC BLD AUTO-RTO: 0 /100 WBCS
PLATELET # BLD AUTO: 282 THOUSANDS/UL (ref 149–390)
PMV BLD AUTO: 12 FL (ref 8.9–12.7)
POTASSIUM SERPL-SCNC: 4.4 MMOL/L (ref 3.5–5.3)
PROT SERPL-MCNC: 7.4 G/DL (ref 6.4–8.4)
PSA SERPL-MCNC: 5.21 NG/ML (ref 0–4)
RBC # BLD AUTO: 5.15 MILLION/UL (ref 3.88–5.62)
SODIUM SERPL-SCNC: 139 MMOL/L (ref 135–147)
TRIGL SERPL-MCNC: 141 MG/DL (ref ?–150)
TSH SERPL DL<=0.05 MIU/L-ACNC: 2.84 UIU/ML (ref 0.45–4.5)
WBC # BLD AUTO: 12.01 THOUSAND/UL (ref 4.31–10.16)

## 2025-03-19 PROCEDURE — 80061 LIPID PANEL: CPT

## 2025-03-19 PROCEDURE — 36415 COLL VENOUS BLD VENIPUNCTURE: CPT

## 2025-03-19 PROCEDURE — 85025 COMPLETE CBC W/AUTO DIFF WBC: CPT

## 2025-03-19 PROCEDURE — G0103 PSA SCREENING: HCPCS

## 2025-03-19 PROCEDURE — 80053 COMPREHEN METABOLIC PANEL: CPT

## 2025-03-19 PROCEDURE — 84443 ASSAY THYROID STIM HORMONE: CPT

## 2025-03-20 ENCOUNTER — RESULTS FOLLOW-UP (OUTPATIENT)
Dept: FAMILY MEDICINE CLINIC | Facility: CLINIC | Age: 70
End: 2025-03-20

## 2025-03-25 ENCOUNTER — RA CDI HCC (OUTPATIENT)
Dept: OTHER | Facility: HOSPITAL | Age: 70
End: 2025-03-25

## 2025-03-31 ENCOUNTER — RESULTS FOLLOW-UP (OUTPATIENT)
Dept: FAMILY MEDICINE CLINIC | Facility: CLINIC | Age: 70
End: 2025-03-31

## 2025-03-31 ENCOUNTER — OFFICE VISIT (OUTPATIENT)
Dept: FAMILY MEDICINE CLINIC | Facility: CLINIC | Age: 70
End: 2025-03-31
Payer: MEDICARE

## 2025-03-31 VITALS
BODY MASS INDEX: 33.03 KG/M2 | HEART RATE: 64 BPM | HEIGHT: 69 IN | SYSTOLIC BLOOD PRESSURE: 130 MMHG | WEIGHT: 223 LBS | TEMPERATURE: 97.8 F | OXYGEN SATURATION: 97 % | DIASTOLIC BLOOD PRESSURE: 80 MMHG | RESPIRATION RATE: 16 BRPM

## 2025-03-31 DIAGNOSIS — R97.20 ELEVATED PSA: ICD-10-CM

## 2025-03-31 DIAGNOSIS — E66.09 CLASS 1 OBESITY DUE TO EXCESS CALORIES WITH SERIOUS COMORBIDITY AND BODY MASS INDEX (BMI) OF 32.0 TO 32.9 IN ADULT: ICD-10-CM

## 2025-03-31 DIAGNOSIS — E78.5 HYPERLIPIDEMIA, UNSPECIFIED HYPERLIPIDEMIA TYPE: ICD-10-CM

## 2025-03-31 DIAGNOSIS — E11.9 TYPE 2 DIABETES MELLITUS WITHOUT COMPLICATION, WITHOUT LONG-TERM CURRENT USE OF INSULIN (HCC): ICD-10-CM

## 2025-03-31 DIAGNOSIS — I10 HYPERTENSION, UNSPECIFIED TYPE: ICD-10-CM

## 2025-03-31 DIAGNOSIS — Z00.00 MEDICARE ANNUAL WELLNESS VISIT, SUBSEQUENT: Primary | ICD-10-CM

## 2025-03-31 DIAGNOSIS — E66.811 CLASS 1 OBESITY DUE TO EXCESS CALORIES WITH SERIOUS COMORBIDITY AND BODY MASS INDEX (BMI) OF 32.0 TO 32.9 IN ADULT: ICD-10-CM

## 2025-03-31 LAB
CREAT UR-MCNC: 336.5 MG/DL
LEFT EYE DIABETIC RETINOPATHY: NORMAL
LEFT EYE IMAGE QUALITY: NORMAL
LEFT EYE MACULAR EDEMA: NORMAL
LEFT EYE OTHER RETINOPATHY: NORMAL
MICROALBUMIN UR-MCNC: 46 MG/L
MICROALBUMIN/CREAT 24H UR: 14 MG/G CREATININE (ref 0–30)
RIGHT EYE DIABETIC RETINOPATHY: NORMAL
RIGHT EYE IMAGE QUALITY: NORMAL
RIGHT EYE MACULAR EDEMA: NORMAL
RIGHT EYE OTHER RETINOPATHY: NORMAL
SEVERITY (EYE EXAM): NORMAL
SL AMB POCT HEMOGLOBIN AIC: 6 (ref ?–6.5)

## 2025-03-31 PROCEDURE — 82570 ASSAY OF URINE CREATININE: CPT | Performed by: NURSE PRACTITIONER

## 2025-03-31 PROCEDURE — 99214 OFFICE O/P EST MOD 30 MIN: CPT | Performed by: NURSE PRACTITIONER

## 2025-03-31 PROCEDURE — G0439 PPPS, SUBSEQ VISIT: HCPCS | Performed by: NURSE PRACTITIONER

## 2025-03-31 PROCEDURE — 83036 HEMOGLOBIN GLYCOSYLATED A1C: CPT | Performed by: NURSE PRACTITIONER

## 2025-03-31 PROCEDURE — G2211 COMPLEX E/M VISIT ADD ON: HCPCS | Performed by: NURSE PRACTITIONER

## 2025-03-31 PROCEDURE — 82043 UR ALBUMIN QUANTITATIVE: CPT | Performed by: NURSE PRACTITIONER

## 2025-03-31 NOTE — ASSESSMENT & PLAN NOTE
LDL 62, at goal. Continue atorvastatin.   Orders:  •  Lipid panel; Future  •  TSH, 3rd generation with Free T4 reflex; Future

## 2025-03-31 NOTE — ASSESSMENT & PLAN NOTE
Lost 10 pounds since last office visit.   Cutting down on sweets.   Planning to start walking this spring.

## 2025-03-31 NOTE — PATIENT INSTRUCTIONS
Medicare Preventive Visit Patient Instructions  Thank you for completing your Welcome to Medicare Visit or Medicare Annual Wellness Visit today. Your next wellness visit will be due in one year (4/1/2026).  The screening/preventive services that you may require over the next 5-10 years are detailed below. Some tests may not apply to you based off risk factors and/or age. Screening tests ordered at today's visit but not completed yet may show as past due. Also, please note that scanned in results may not display below.  Preventive Screenings:  Service Recommendations Previous Testing/Comments   Colorectal Cancer Screening  Colonoscopy    Fecal Occult Blood Test (FOBT)/Fecal Immunochemical Test (FIT)  Fecal DNA/Cologuard Test  Flexible Sigmoidoscopy Age: 45-75 years old   Colonoscopy: every 10 years (May be performed more frequently if at higher risk)  OR  FOBT/FIT: every 1 year  OR  Cologuard: every 3 years  OR  Sigmoidoscopy: every 5 years  Screening may be recommended earlier than age 45 if at higher risk for colorectal cancer. Also, an individualized decision between you and your healthcare provider will decide whether screening between the ages of 76-85 would be appropriate. Colonoscopy: Not on file  FOBT/FIT: Not on file  Cologuard: 06/05/2023  Sigmoidoscopy: Not on file    Screening Current  Due for Cologuard     Prostate Cancer Screening Individualized decision between patient and health care provider in men between ages of 55-69   Medicare will cover every 12 months beginning on the day after your 50th birthday PSA: 5.206 ng/mL     Screening Current  Due for PSA     Hepatitis C Screening Once for adults born between 1945 and 1965  More frequently in patients at high risk for Hepatitis C Hep C Antibody: 02/20/2020    Screening Current   Diabetes Screening 1-2 times per year if you're at risk for diabetes or have pre-diabetes Fasting glucose: 152 mg/dL (3/19/2025)  A1C: 6.0 (3/31/2025)  Screening Not  Indicated  History Diabetes  Due for Blood Glucose   Cholesterol Screening Once every 5 years if you don't have a lipid disorder. May order more often based on risk factors. Lipid panel: 03/19/2025  Screening Not Indicated  History Lipid Disorder      Other Preventive Screenings Covered by Medicare:  Abdominal Aortic Aneurysm (AAA) Screening: covered once if your at risk. You're considered to be at risk if you have a family history of AAA or a male between the age of 65-75 who smoking at least 100 cigarettes in your lifetime.  Lung Cancer Screening: covers low dose CT scan once per year if you meet all of the following conditions: (1) Age 55-77; (2) No signs or symptoms of lung cancer; (3) Current smoker or have quit smoking within the last 15 years; (4) You have a tobacco smoking history of at least 20 pack years (packs per day x number of years you smoked); (5) You get a written order from a healthcare provider.  Glaucoma Screening: covered annually if you're considered high risk: (1) You have diabetes OR (2) Family history of glaucoma OR (3)  aged 50 and older OR (4)  American aged 65 and older  Osteoporosis Screening: covered every 2 years if you meet one of the following conditions: (1) Have a vertebral abnormality; (2) On glucocorticoid therapy for more than 3 months; (3) Have primary hyperparathyroidism; (4) On osteoporosis medications and need to assess response to drug therapy.  HIV Screening: covered annually if you're between the age of 15-65. Also covered annually if you are younger than 15 and older than 65 with risk factors for HIV infection. For pregnant patients, it is covered up to 3 times per pregnancy.    Immunizations:  Immunization Recommendations   Influenza Vaccine Annual influenza vaccination during flu season is recommended for all persons aged >= 6 months who do not have contraindications   Pneumococcal Vaccine   * Pneumococcal conjugate vaccine = PCV13 (Prevnar 13),  PCV15 (Vaxneuvance), PCV20 (Prevnar 20)  * Pneumococcal polysaccharide vaccine = PPSV23 (Pneumovax) Adults 19-65 yo with certain risk factors or if 65+ yo  If never received any pneumonia vaccine: recommend Prevnar 20 (PCV20)  Give PCV20 if previously received 1 dose of PCV13 or PPSV23   Hepatitis B Vaccine 3 dose series if at intermediate or high risk (ex: diabetes, end stage renal disease, liver disease)   Respiratory syncytial virus (RSV) Vaccine - COVERED BY MEDICARE PART D  * RSVPreF3 (Arexvy) CDC recommends that adults 60 years of age and older may receive a single dose of RSV vaccine using shared clinical decision-making (SCDM)   Tetanus (Td) Vaccine - COST NOT COVERED BY MEDICARE PART B Following completion of primary series, a booster dose should be given every 10 years to maintain immunity against tetanus. Td may also be given as tetanus wound prophylaxis.   Tdap Vaccine - COST NOT COVERED BY MEDICARE PART B Recommended at least once for all adults. For pregnant patients, recommended with each pregnancy.   Shingles Vaccine (Shingrix) - COST NOT COVERED BY MEDICARE PART B  2 shot series recommended in those 19 years and older who have or will have weakened immune systems or those 50 years and older     Health Maintenance Due:      Topic Date Due   • Colorectal Cancer Screening  06/05/2026   • Hepatitis C Screening  Completed     Immunizations Due:      Topic Date Due   • Influenza Vaccine (1) 09/01/2024   • COVID-19 Vaccine (3 - 2024-25 season) 09/01/2024     Advance Directives   What are advance directives?  Advance directives are legal documents that state your wishes and plans for medical care. These plans are made ahead of time in case you lose your ability to make decisions for yourself. Advance directives can apply to any medical decision, such as the treatments you want, and if you want to donate organs.   What are the types of advance directives?  There are many types of advance directives, and each  state has rules about how to use them. You may choose a combination of any of the following:  Living will:  This is a written record of the treatment you want. You can also choose which treatments you do not want, which to limit, and which to stop at a certain time. This includes surgery, medicine, IV fluid, and tube feedings.   Durable power of  for healthcare (DPAHC):  This is a written record that states who you want to make healthcare choices for you when you are unable to make them for yourself. This person, called a proxy, is usually a family member or a friend. You may choose more than 1 proxy.  Do not resuscitate (DNR) order:  A DNR order is used in case your heart stops beating or you stop breathing. It is a request not to have certain forms of treatment, such as CPR. A DNR order may be included in other types of advance directives.  Medical directive:  This covers the care that you want if you are in a coma, near death, or unable to make decisions for yourself. You can list the treatments you want for each condition. Treatment may include pain medicine, surgery, blood transfusions, dialysis, IV or tube feedings, and a ventilator (breathing machine).  Values history:  This document has questions about your views, beliefs, and how you feel and think about life. This information can help others choose the care that you would choose.  Why are advance directives important?  An advance directive helps you control your care. Although spoken wishes may be used, it is better to have your wishes written down. Spoken wishes can be misunderstood, or not followed. Treatments may be given even if you do not want them. An advance directive may make it easier for your family to make difficult choices about your care.   Weight Management   Why it is important to manage your weight:  Being overweight increases your risk of health conditions such as heart disease, high blood pressure, type 2 diabetes, and certain  types of cancer. It can also increase your risk for osteoarthritis, sleep apnea, and other respiratory problems. Aim for a slow, steady weight loss. Even a small amount of weight loss can lower your risk of health problems.  How to lose weight safely:  A safe and healthy way to lose weight is to eat fewer calories and get regular exercise. You can lose up about 1 pound a week by decreasing the number of calories you eat by 500 calories each day.   Healthy meal plan for weight management:  A healthy meal plan includes a variety of foods, contains fewer calories, and helps you stay healthy. A healthy meal plan includes the following:  Eat whole-grain foods more often.  A healthy meal plan should contain fiber. Fiber is the part of grains, fruits, and vegetables that is not broken down by your body. Whole-grain foods are healthy and provide extra fiber in your diet. Some examples of whole-grain foods are whole-wheat breads and pastas, oatmeal, brown rice, and bulgur.  Eat a variety of vegetables every day.  Include dark, leafy greens such as spinach, kale, andreina greens, and mustard greens. Eat yellow and orange vegetables such as carrots, sweet potatoes, and winter squash.   Eat a variety of fruits every day.  Choose fresh or canned fruit (canned in its own juice or light syrup) instead of juice. Fruit juice has very little or no fiber.  Eat low-fat dairy foods.  Drink fat-free (skim) milk or 1% milk. Eat fat-free yogurt and low-fat cottage cheese. Try low-fat cheeses such as mozzarella and other reduced-fat cheeses.  Choose meat and other protein foods that are low in fat.  Choose beans or other legumes such as split peas or lentils. Choose fish, skinless poultry (chicken or turkey), or lean cuts of red meat (beef or pork). Before you cook meat or poultry, cut off any visible fat.   Use less fat and oil.  Try baking foods instead of frying them. Add less fat, such as margarine, sour cream, regular salad dressing and  mayonnaise to foods. Eat fewer high-fat foods. Some examples of high-fat foods include french fries, doughnuts, ice cream, and cakes.  Eat fewer sweets.  Limit foods and drinks that are high in sugar. This includes candy, cookies, regular soda, and sweetened drinks.  Exercise:  Exercise at least 30 minutes per day on most days of the week. Some examples of exercise include walking, biking, dancing, and swimming. You can also fit in more physical activity by taking the stairs instead of the elevator or parking farther away from stores. Ask your healthcare provider about the best exercise plan for you.      © Copyright [employer] 2018 Information is for End User's use only and may not be sold, redistributed or otherwise used for commercial purposes. All illustrations and images included in CareNotes® are the copyrighted property of [employer].A[employer]M., Inc. or [employer]

## 2025-03-31 NOTE — ASSESSMENT & PLAN NOTE
Stable blood pressure on current medications.     Orders:  •  CBC and differential; Future  •  Comprehensive metabolic panel; Future  •  TSH, 3rd generation with Free T4 reflex; Future

## 2025-03-31 NOTE — PROGRESS NOTES
Name: Torey Deal      : 1955      MRN: [id number]  Encounter Provider: SEGUNDO Medina  Encounter Date: 3/31/2025   Encounter department: [facility]    Assessment & Plan  Medicare annual wellness visit, subsequent         Type 2 diabetes mellitus without complication, without long-term current use of insulin ([facility])    Lab Results   Component Value Date    HGBA1C 6.0 2025     A1c has improved from 8.6% to 6%.   Continue diet modifications, increase exercise.   Continue Metformin.   Albumin creatinine ratio today.   Eye exam, foot exam today.       Orders:  •  POCT hemoglobin A1c  •  CBC and differential; Future  •  Comprehensive metabolic panel; Future  •  Hemoglobin A1C; Future  •  IRIS Diabetic eye exam  •  Albumin / creatinine urine ratio    Hypertension, unspecified type  Stable blood pressure on current medications.     Orders:  •  CBC and differential; Future  •  Comprehensive metabolic panel; Future  •  TSH, 3rd generation with Free T4 reflex; Future    Hyperlipidemia, unspecified hyperlipidemia type  LDL 62, at goal. Continue atorvastatin.   Orders:  •  Lipid panel; Future  •  TSH, 3rd generation with Free T4 reflex; Future    Class 1 obesity due to excess calories with serious comorbidity and body mass index (BMI) of 32.0 to 32.9 in adult  Lost 10 pounds since last office visit.   Cutting down on sweets.   Planning to start walking this spring.          Elevated PSA  Repeat PSA in 4 weeks.     Orders:  •  PSA Total, Diagnostic; Future      Depression Screening and Follow-up Plan: Patient was screened for depression during today's encounter. They screened negative with a PHQ-2 score of 0.        Preventive health issues were discussed with patient, and age appropriate screening tests were ordered as noted in patient's After Visit Summary. Personalized health advice and appropriate referrals for health education or preventive services given if needed, as noted in patient's  After Visit Summary.    History of Present Illness     Torey Deal is a 69 year old male presenting today for follow up on diabetes and annual medicare wellness.     Drinking diet green tea.   Trying to limit sweets.     Skn lesion left lower ankle --will see dr stock.       Patient Care Team:  SEGUNDO Medina as PCP - General (Family Medicine)  Cary Martin MD    Review of Systems   Constitutional: Negative.    HENT: Negative.     Respiratory: Negative.     Cardiovascular: Negative.    Gastrointestinal: Negative.    Genitourinary: Negative.    Musculoskeletal: Negative.    Skin: Negative.    Neurological: Negative.    Hematological: Negative.    Psychiatric/Behavioral: Negative.       Medical History Reviewed by provider this encounter:  Tobacco  Allergies  Meds  Problems  Med Hx  Surg Hx  Fam Hx       Annual Wellness Visit Questionnaire   Torey is here for his Subsequent Wellness visit. Last Medicare Wellness visit information reviewed, patient interviewed and updates made to the record today.      Health Risk Assessment:   Patient rates overall health as very good. Patient feels that their physical health rating is slightly better. Patient is very satisfied with their life. Eyesight was rated as same. Hearing was rated as same. Patient feels that their emotional and mental health rating is slightly better. Patients states they are never, rarely angry. Patient states they are never, rarely unusually tired/fatigued. Pain experienced in the last 7 days has been none. Patient states that he has experienced no weight loss or gain in last 6 months.     Depression Screening:   PHQ-2 Score: 0      Fall Risk Screening:   In the past year, patient has experienced: no history of falling in past year      Home Safety:  Patient does not have trouble with stairs inside or outside of their home. Patient has working smoke alarms and has working carbon monoxide detector. Home safety hazards include: none.  "    Nutrition:   Current diet is Regular.     Medications:   Patient is currently taking over-the-counter supplements. OTC medications include: see medication list. Patient is able to manage medications.     Activities of Daily Living (ADLs)/Instrumental Activities of Daily Living (IADLs):   Walk and transfer into and out of bed and chair?: Yes  Dress and groom yourself?: Yes    Bathe or shower yourself?: Yes    Feed yourself? Yes  Do your laundry/housekeeping?: Yes  Manage your money, pay your bills and track your expenses?: Yes  Make your own meals?: Yes    Do your own shopping?: Yes    Previous Hospitalizations:   Any hospitalizations or ED visits within the last 12 months?: No      Advance Care Planning:   Living will: No    Durable POA for healthcare: No    Advanced directive: No    Advanced directive counseling given: Yes    ACP document given: Yes    End of Life Decisions reviewed with patient: Yes      Comments: Does not have anyone that he would like to make decisions for him if he could not speak for himself.   End stage of life does not want to be on ventilator, does not want a feeding tube. \"I do not want to be a vegetable.\"            Cognitive Screening:   Provider or family/friend/caregiver concerned regarding cognition?: No    PREVENTIVE SCREENINGS      Cardiovascular Screening:    General: Screening Not Indicated and History Lipid Disorder      Diabetes Screening:     General: Screening Not Indicated and History Diabetes    Due for: Blood Glucose      Colorectal Cancer Screening:     General: Screening Current    Due for: Cologuard      Prostate Cancer Screening:    General: Screening Current    Due for: PSA      Osteoporosis Screening:    General: Screening Not Indicated      Abdominal Aortic Aneurysm (AAA) Screening:    Risk factors include: age between 65-76 yo        General: Screening Not Indicated      Lung Cancer Screening:     General: Screening Not Indicated      Hepatitis C Screening:    " "General: Screening Current    Screening, Brief Intervention, and Referral to Treatment (SBIRT)     Screening      Single Item Drug Screening:  How often have you used an illegal drug (including marijuana) or a prescription medication for non-medical reasons in the past year? never    Single Item Drug Screen Score: 0  Interpretation: Negative screen for possible drug use disorder    Brief Intervention  Alcohol & drug use screenings were reviewed. No concerns regarding substance use disorder identified.     Social Drivers of Health     Financial Resource Strain: Low Risk  (3/28/2023)    Overall Financial Resource Strain (CARDIA)    • Difficulty of Paying Living Expenses: Not very hard   Food Insecurity: No Food Insecurity (3/31/2025)    Hunger Vital Sign    • Worried About Running Out of Food in the Last Year: Never true    • Ran Out of Food in the Last Year: Never true   Transportation Needs: No Transportation Needs (3/31/2025)    PRAPARE - Transportation    • Lack of Transportation (Medical): No    • Lack of Transportation (Non-Medical): No   Housing Stability: Low Risk  (3/31/2025)    Housing Stability Vital Sign    • Unable to Pay for Housing in the Last Year: No    • Number of Times Moved in the Last Year: 1    • Homeless in the Last Year: No   Utilities: Not At Risk (3/31/2025)    [facility] Utilities    • Threatened with loss of utilities: No     No results found.    Objective   /80   Pulse 64   Temp 97.8 °F (36.6 °C) (Temporal)   Resp 16   Ht 5' 9\" (1.753 m)   Wt 101 kg (223 lb)   SpO2 97%   BMI 32.93 kg/m²     Physical Exam  Vitals and nursing note reviewed.   Constitutional:       General: He is not in acute distress.     Appearance: Normal appearance. He is well-developed. He is not ill-appearing.   HENT:      Head: Normocephalic and atraumatic.      Right Ear: Tympanic membrane and ear canal normal.      Left Ear: Tympanic membrane and ear canal normal.      Mouth/Throat:      Mouth: Mucous membranes " are moist.      Pharynx: Oropharynx is clear.   Eyes:      Conjunctiva/sclera: Conjunctivae normal.      Pupils: Pupils are equal, round, and reactive to light.   Neck:      Thyroid: No thyromegaly.   Cardiovascular:      Rate and Rhythm: Normal rate and regular rhythm.      Pulses: no weak pulses.           Dorsalis pedis pulses are 2+ on the right side and 2+ on the left side.        Posterior tibial pulses are 2+ on the right side and 2+ on the left side.      Heart sounds: Normal heart sounds. No murmur heard.  Pulmonary:      Effort: Pulmonary effort is normal. No respiratory distress.      Breath sounds: Normal breath sounds.   Abdominal:      General: Bowel sounds are normal.      Palpations: Abdomen is soft.      Tenderness: There is no abdominal tenderness. There is no guarding.   Musculoskeletal:         General: No deformity.      Cervical back: Normal range of motion and neck supple.   Feet:      Right foot:      Skin integrity: Dry skin present. No ulcer, skin breakdown, erythema, warmth or callus.      Left foot:      Skin integrity: Dry skin present. No ulcer, skin breakdown, erythema, warmth or callus.   Lymphadenopathy:      Cervical: No cervical adenopathy.   Skin:     General: Skin is warm and dry.      Findings: No rash.      Comments: Right lateral ankle with soft visible, palpable cyst.  Right medial ankle with skin lesion, raised, brown in color, rough texture.    Neurological:      Mental Status: He is alert and oriented to person, place, and time.   Psychiatric:         Mood and Affect: Mood normal.     Diabetic Foot Exam    Patient's shoes and socks removed.    Right Foot/Ankle   Right Foot Inspection  Skin Exam: skin normal, skin intact and dry skin. No warmth, no callus, no erythema, no maceration, no abnormal color, no pre-ulcer, no ulcer and no callus.     Toe Exam: ROM and strength within normal limits.     Sensory   Proprioception: intact  Monofilament testing:  intact    Vascular  Capillary refills: < 3 seconds  The right DP pulse is 2+. The right PT pulse is 2+.     Left Foot/Ankle  Left Foot Inspection  Skin Exam: skin normal, skin intact and dry skin. No warmth, no erythema, no maceration, normal color, no pre-ulcer, no ulcer and no callus.     Toe Exam: ROM and strength within normal limits.     Sensory   Proprioception: intact  Monofilament testing: intact    Vascular  Capillary refills: < 3 seconds  The left DP pulse is 2+. The left PT pulse is 2+.     Assign Risk Category  No deformity present  No loss of protective sensation  No weak pulses  Risk: 0        Current Outpatient Medications:   •  amLODIPine (NORVASC) 10 mg tablet, TAKE 1 TABLET (10 MG TOTAL) BY MOUTH DAILY, Disp: 30 tablet, Rfl: 5  •  atorvastatin (LIPITOR) 20 mg tablet, TAKE 1 TABLET (20 MG TOTAL) BY MOUTH DAILY, Disp: 90 tablet, Rfl: 1  •  benazepril (LOTENSIN) 40 MG tablet, TAKE 1 TABLET (40 MG TOTAL) BY MOUTH DAILY, Disp: 90 tablet, Rfl: 1  •  calcium carbonate (OS-HORACE) 600 MG tablet, Take 660 mg by mouth daily, Disp: , Rfl:   •  carvedilol (COREG) 6.25 mg tablet, TAKE 1 TABLET (6.25 MG TOTAL) BY MOUTH 2 (TWO) TIMES A DAY, Disp: 60 tablet, Rfl: 5  •  Cholecalciferol (VITAMIN D) 2000 units tablet, Take 5,000 Units by mouth daily , Disp: , Rfl:   •  hydroCHLOROthiazide 25 mg tablet, TAKE 1 TABLET (25 MG TOTAL) BY MOUTH DAILY, Disp: 90 tablet, Rfl: 1  •  ipratropium (ATROVENT) 0.06 % nasal spray, 2 sprays into each nostril 3 (three) times a day as needed  , Disp: , Rfl:   •  magnesium gluconate (MAGONATE) 30 MG tablet, Take 30 mg by mouth 2 (two) times a day, Disp: , Rfl:   •  metFORMIN (GLUCOPHAGE-XR) 500 mg 24 hr tablet, Take 2 tablets (1,000 mg total) by mouth 2 (two) times a day with meals, Disp: 360 tablet, Rfl: 3  •  Multiple Vitamin (MULTI-VITAMIN) tablet, Take 1 tablet by mouth , Disp: , Rfl:   •  Omega-3 Fatty Acids (fish oil) 1,000 mg, Take 1,000 mg by mouth 2 (two) times a day, Disp: , Rfl:

## 2025-03-31 NOTE — ASSESSMENT & PLAN NOTE
Lab Results   Component Value Date    HGBA1C 6.0 03/31/2025     A1c has improved from 8.6% to 6%.   Continue diet modifications, increase exercise.   Continue Metformin.   Albumin creatinine ratio today.   Eye exam, foot exam today.       Orders:  •  POCT hemoglobin A1c  •  CBC and differential; Future  •  Comprehensive metabolic panel; Future  •  Hemoglobin A1C; Future  •  IRIS Diabetic eye exam  •  Albumin / creatinine urine ratio

## 2025-04-01 NOTE — TELEPHONE ENCOUNTER
----- Message from SEGUNDO Holloway sent at 3/31/2025  7:25 PM EDT -----  Please let Iván know iris eye exam is normal. Repeat in one year.

## 2025-05-05 DIAGNOSIS — I10 HYPERTENSION, UNSPECIFIED TYPE: ICD-10-CM

## 2025-05-05 DIAGNOSIS — E78.5 HYPERLIPIDEMIA, UNSPECIFIED HYPERLIPIDEMIA TYPE: ICD-10-CM

## 2025-05-06 RX ORDER — AMLODIPINE BESYLATE 10 MG/1
10 TABLET ORAL DAILY
Qty: 30 TABLET | Refills: 5 | Status: SHIPPED | OUTPATIENT
Start: 2025-05-06

## 2025-05-06 RX ORDER — HYDROCHLOROTHIAZIDE 25 MG/1
25 TABLET ORAL DAILY
Qty: 30 TABLET | Refills: 5 | Status: SHIPPED | OUTPATIENT
Start: 2025-05-06

## 2025-05-06 RX ORDER — BENAZEPRIL HYDROCHLORIDE 40 MG/1
40 TABLET ORAL DAILY
Qty: 30 TABLET | Refills: 5 | Status: SHIPPED | OUTPATIENT
Start: 2025-05-06

## 2025-05-06 RX ORDER — CARVEDILOL 6.25 MG/1
6.25 TABLET ORAL 2 TIMES DAILY
Qty: 60 TABLET | Refills: 5 | Status: SHIPPED | OUTPATIENT
Start: 2025-05-06

## 2025-05-06 RX ORDER — ATORVASTATIN CALCIUM 20 MG/1
20 TABLET, FILM COATED ORAL DAILY
Qty: 30 TABLET | Refills: 5 | Status: SHIPPED | OUTPATIENT
Start: 2025-05-06